# Patient Record
Sex: FEMALE | Race: WHITE | NOT HISPANIC OR LATINO | Employment: FULL TIME | URBAN - METROPOLITAN AREA
[De-identification: names, ages, dates, MRNs, and addresses within clinical notes are randomized per-mention and may not be internally consistent; named-entity substitution may affect disease eponyms.]

---

## 2018-11-29 ENCOUNTER — TRANSCRIBE ORDERS (OUTPATIENT)
Dept: ADMINISTRATIVE | Facility: HOSPITAL | Age: 48
End: 2018-11-29

## 2018-11-29 DIAGNOSIS — Z12.31 VISIT FOR SCREENING MAMMOGRAM: Primary | ICD-10-CM

## 2019-01-14 ENCOUNTER — HOSPITAL ENCOUNTER (OUTPATIENT)
Dept: MAMMOGRAPHY | Facility: HOSPITAL | Age: 49
Discharge: HOME | End: 2019-01-14
Admitting: PHYSICIAN ASSISTANT

## 2019-01-14 ENCOUNTER — APPOINTMENT (OUTPATIENT)
Dept: OTHER | Facility: HOSPITAL | Age: 49
End: 2019-01-14

## 2019-01-14 DIAGNOSIS — Z12.31 VISIT FOR SCREENING MAMMOGRAM: ICD-10-CM

## 2019-01-14 PROCEDURE — 77067 SCR MAMMO BI INCL CAD: CPT

## 2019-01-14 PROCEDURE — 77063 BREAST TOMOSYNTHESIS BI: CPT

## 2019-01-14 PROCEDURE — 77067 SCR MAMMO BI INCL CAD: CPT | Performed by: RADIOLOGY

## 2019-01-14 PROCEDURE — 77063 BREAST TOMOSYNTHESIS BI: CPT | Performed by: RADIOLOGY

## 2019-06-19 PROBLEM — M06.9 RHEUMATOID ARTHRITIS: Status: ACTIVE | Noted: 2019-06-19

## 2019-06-20 ENCOUNTER — OFFICE VISIT (OUTPATIENT)
Dept: INTERNAL MEDICINE | Facility: CLINIC | Age: 49
End: 2019-06-20

## 2019-06-20 ENCOUNTER — APPOINTMENT (OUTPATIENT)
Dept: LAB | Facility: HOSPITAL | Age: 49
End: 2019-06-20

## 2019-06-20 VITALS — SYSTOLIC BLOOD PRESSURE: 180 MMHG | DIASTOLIC BLOOD PRESSURE: 100 MMHG | WEIGHT: 186 LBS | HEART RATE: 60 BPM

## 2019-06-20 DIAGNOSIS — Z13.220 SCREENING FOR LIPID DISORDERS: ICD-10-CM

## 2019-06-20 DIAGNOSIS — M05.9 RHEUMATOID ARTHRITIS WITH POSITIVE RHEUMATOID FACTOR, INVOLVING UNSPECIFIED SITE (HCC): Primary | ICD-10-CM

## 2019-06-20 DIAGNOSIS — R53.83 FATIGUE, UNSPECIFIED TYPE: ICD-10-CM

## 2019-06-20 DIAGNOSIS — M25.50 ARTHRALGIA, UNSPECIFIED JOINT: ICD-10-CM

## 2019-06-20 DIAGNOSIS — R73.09 ABNORMAL GLUCOSE: ICD-10-CM

## 2019-06-20 DIAGNOSIS — I10 ESSENTIAL HYPERTENSION: ICD-10-CM

## 2019-06-20 LAB
25(OH)D3 SERPL-MCNC: 28.2 NG/ML (ref 30–100)
ALBUMIN SERPL-MCNC: 4.7 G/DL (ref 3.5–5.2)
ALBUMIN/GLOB SERPL: 1.4 G/DL
ALP SERPL-CCNC: 95 U/L (ref 39–117)
ALT SERPL W P-5'-P-CCNC: 11 U/L (ref 1–33)
ANION GAP SERPL CALCULATED.3IONS-SCNC: 12.6 MMOL/L
AST SERPL-CCNC: 16 U/L (ref 1–32)
BASOPHILS # BLD AUTO: 0.04 10*3/MM3 (ref 0–0.2)
BASOPHILS NFR BLD AUTO: 0.7 % (ref 0–1.5)
BILIRUB SERPL-MCNC: 0.6 MG/DL (ref 0.2–1.2)
BUN BLD-MCNC: 10 MG/DL (ref 6–20)
BUN/CREAT SERPL: 15.6 (ref 7–25)
CALCIUM SPEC-SCNC: 9.4 MG/DL (ref 8.6–10.5)
CHLORIDE SERPL-SCNC: 99 MMOL/L (ref 98–107)
CHOLEST SERPL-MCNC: 215 MG/DL (ref 0–200)
CO2 SERPL-SCNC: 24.4 MMOL/L (ref 22–29)
CREAT BLD-MCNC: 0.64 MG/DL (ref 0.57–1)
DEPRECATED RDW RBC AUTO: 45.9 FL (ref 37–54)
DIFFERENTIAL METHOD BLD: ABNORMAL
EOSINOPHIL # BLD AUTO: 0.1 10*3/MM3 (ref 0–0.4)
EOSINOPHIL NFR BLD AUTO: 1.6 % (ref 0.3–6.2)
ERYTHROCYTE [DISTWIDTH] IN BLOOD BY AUTOMATED COUNT: 13.1 % (ref 12.3–15.4)
FSH SERPL-ACNC: 22.4 MIU/ML
GFR SERPL CREATININE-BSD FRML MDRD: 99 ML/MIN/1.73
GLOBULIN UR ELPH-MCNC: 3.4 GM/DL
GLUCOSE BLD-MCNC: 87 MG/DL (ref 65–99)
HBA1C MFR BLD: 5 % (ref 4.8–5.6)
HCT VFR BLD AUTO: 46.2 % (ref 34–46.6)
HDLC SERPL-MCNC: 72 MG/DL (ref 40–60)
HGB BLD-MCNC: 14.3 G/DL (ref 12–15.9)
IMM GRANULOCYTES # BLD AUTO: 0.01 10*3/MM3 (ref 0–0.05)
IMM GRANULOCYTES NFR BLD AUTO: 0.2 % (ref 0–0.5)
LDLC SERPL CALC-MCNC: 132 MG/DL (ref 0–100)
LDLC/HDLC SERPL: 1.83 {RATIO}
LYMPHOCYTES # BLD AUTO: 1.9 10*3/MM3 (ref 0.7–3.1)
LYMPHOCYTES NFR BLD AUTO: 30.9 % (ref 19.6–45.3)
MCH RBC QN AUTO: 29.5 PG (ref 26.6–33)
MCHC RBC AUTO-ENTMCNC: 31 G/DL (ref 31.5–35.7)
MCV RBC AUTO: 95.3 FL (ref 79–97)
MONOCYTES # BLD AUTO: 0.37 10*3/MM3 (ref 0.1–0.9)
MONOCYTES NFR BLD AUTO: 6 % (ref 5–12)
NEUTROPHILS # BLD AUTO: 3.73 10*3/MM3 (ref 1.7–7)
NEUTROPHILS NFR BLD AUTO: 60.6 % (ref 42.7–76)
NRBC BLD AUTO-RTO: 0 /100 WBC (ref 0–0.2)
PLATELET # BLD AUTO: 219 10*3/MM3 (ref 140–450)
PMV BLD AUTO: 13.2 FL (ref 6–12)
POTASSIUM BLD-SCNC: 4.9 MMOL/L (ref 3.5–5.2)
PROT SERPL-MCNC: 8.1 G/DL (ref 6–8.5)
RBC # BLD AUTO: 4.85 10*6/MM3 (ref 3.77–5.28)
SODIUM BLD-SCNC: 136 MMOL/L (ref 136–145)
TRIGL SERPL-MCNC: 55 MG/DL (ref 0–150)
TSH SERPL DL<=0.05 MIU/L-ACNC: 0.86 MIU/ML (ref 0.27–4.2)
VIT B12 BLD-MCNC: 524 PG/ML (ref 211–946)
VLDLC SERPL-MCNC: 11 MG/DL (ref 5–40)
WBC # BLD AUTO: 6.15 10*3/MM3 (ref 3.4–10.8)
WBC NRBC COR # BLD: 6.15 10*3/MM3 (ref 3.4–10.8)

## 2019-06-20 PROCEDURE — 84443 ASSAY THYROID STIM HORMONE: CPT | Performed by: PHYSICIAN ASSISTANT

## 2019-06-20 PROCEDURE — 83001 ASSAY OF GONADOTROPIN (FSH): CPT | Performed by: PHYSICIAN ASSISTANT

## 2019-06-20 PROCEDURE — 80053 COMPREHEN METABOLIC PANEL: CPT | Performed by: PHYSICIAN ASSISTANT

## 2019-06-20 PROCEDURE — 82306 VITAMIN D 25 HYDROXY: CPT | Performed by: PHYSICIAN ASSISTANT

## 2019-06-20 PROCEDURE — 80061 LIPID PANEL: CPT | Performed by: PHYSICIAN ASSISTANT

## 2019-06-20 PROCEDURE — 99214 OFFICE O/P EST MOD 30 MIN: CPT | Performed by: PHYSICIAN ASSISTANT

## 2019-06-20 PROCEDURE — 82043 UR ALBUMIN QUANTITATIVE: CPT | Performed by: PHYSICIAN ASSISTANT

## 2019-06-20 PROCEDURE — 83036 HEMOGLOBIN GLYCOSYLATED A1C: CPT | Performed by: PHYSICIAN ASSISTANT

## 2019-06-20 PROCEDURE — 85025 COMPLETE CBC W/AUTO DIFF WBC: CPT | Performed by: PHYSICIAN ASSISTANT

## 2019-06-20 PROCEDURE — 85652 RBC SED RATE AUTOMATED: CPT | Performed by: PHYSICIAN ASSISTANT

## 2019-06-20 PROCEDURE — 82607 VITAMIN B-12: CPT | Performed by: PHYSICIAN ASSISTANT

## 2019-06-20 RX ORDER — LISINOPRIL 10 MG/1
10 TABLET ORAL DAILY
Qty: 30 TABLET | Refills: 2 | Status: SHIPPED | OUTPATIENT
Start: 2019-06-20 | End: 2019-09-23 | Stop reason: SDUPTHER

## 2019-06-20 RX ORDER — HYDROXYZINE HYDROCHLORIDE 25 MG/1
25 TABLET, FILM COATED ORAL 3 TIMES DAILY PRN
Qty: 30 TABLET | Refills: 0 | Status: SHIPPED | OUTPATIENT
Start: 2019-06-20 | End: 2019-07-15

## 2019-06-20 RX ORDER — HYDROCHLOROTHIAZIDE 25 MG/1
25 TABLET ORAL DAILY
Qty: 30 TABLET | Refills: 3 | Status: SHIPPED | OUTPATIENT
Start: 2019-06-20 | End: 2019-09-30 | Stop reason: SDUPTHER

## 2019-06-20 NOTE — PROGRESS NOTES
Patient Care Team:  Aimee Levine PA-C as PCP - General (Internal Medicine)    Chief Complaint::   Chief Complaint   Patient presents with   • Fatigue   • Joint Swelling   • Nausea        Subjective     HPI  Pt has hx of elevated inflammatory markers, told she had RA, never did any type of treatment.      Stressful job as .  She recently has changed locations within the company.   is out of town this month. He is a .  Her ex-, father of her two children,  suddenly.      She experiences joint pain mostly in the morning.  Gets better as she moves a bit.  Pain returns around 2pm.         Hypertension   This is a new problem. The current episode started more than 1 month ago. The problem has been gradually worsening since onset. The problem is uncontrolled. Associated symptoms include anxiety, headaches, malaise/fatigue and peripheral edema. Pertinent negatives include no blurred vision, chest pain, palpitations or shortness of breath. There are no associated agents to hypertension. Risk factors for coronary artery disease include obesity, sedentary lifestyle, stress and family history. Past treatments include nothing. Current antihypertension treatment includes nothing. Compliance problems include diet and exercise.    Fatigue   This is a recurrent problem. The current episode started more than 1 month ago. The problem occurs constantly. The problem has been gradually worsening. Associated symptoms include arthralgias, fatigue, headaches, joint swelling and myalgias. Pertinent negatives include no abdominal pain, chest pain, chills, congestion, coughing or fever. Nothing aggravates the symptoms.       PMH  The following portions of the patient's history were reviewed and updated as appropriate: allergies, current medications, past family history, past medical history, past social history, past surgical history and problem list.    Review of Systems:    Review of Systems   Constitutional: Positive for fatigue and malaise/fatigue. Negative for activity change, appetite change, chills and fever.   HENT: Negative for congestion, ear pain and sinus pressure.    Eyes: Negative for blurred vision.   Respiratory: Negative for cough, chest tightness, shortness of breath and wheezing.    Cardiovascular: Positive for leg swelling. Negative for chest pain and palpitations.   Gastrointestinal: Negative for abdominal distention, abdominal pain, blood in stool and constipation.   Endocrine: Negative for cold intolerance and heat intolerance.   Musculoskeletal: Positive for arthralgias, joint swelling and myalgias.   Skin: Negative for color change.   Allergic/Immunologic: Negative for environmental allergies and food allergies.   Neurological: Negative for dizziness, speech difficulty, light-headedness and headache.   Psychiatric/Behavioral: Positive for stress. Negative for decreased concentration. The patient is not nervous/anxious.        Vital Signs  Vitals:    06/20/19 1252 06/20/19 1417   BP: (!) 192/98 180/100   BP Location: Left arm    Patient Position: Sitting    Cuff Size: Adult    Pulse: 60    Weight: 84.4 kg (186 lb)        Labs  No visits with results within 3 Month(s) from this visit.   Latest known visit with results is:   No results found for any previous visit.       Imaging  All imaging:   Imaging Results (all)     None             Current Outpatient Medications:   •  hydrochlorothiazide (HYDRODIURIL) 25 MG tablet, Take 1 tablet by mouth Daily., Disp: 30 tablet, Rfl: 3  •  hydrOXYzine (ATARAX) 25 MG tablet, Take 1 tablet by mouth 3 (Three) Times a Day As Needed for Itching., Disp: 30 tablet, Rfl: 0  •  lisinopril (PRINIVIL,ZESTRIL) 10 MG tablet, Take 1 tablet by mouth Daily., Disp: 30 tablet, Rfl: 2    Physical Exam:    Physical Exam   Constitutional: She is oriented to person, place, and time. She appears well-developed and well-nourished.   HENT:   Head:  Normocephalic and atraumatic.   Nose: Nose normal.   Mouth/Throat: Oropharynx is clear and moist.   Eyes: Conjunctivae and EOM are normal. Pupils are equal, round, and reactive to light.   Neck: Normal range of motion. Neck supple.   Cardiovascular: Normal rate, regular rhythm, normal heart sounds and intact distal pulses.   Pulmonary/Chest: Effort normal and breath sounds normal.   Abdominal: Soft. Bowel sounds are normal.   Musculoskeletal: She exhibits edema and tenderness.   Neurological: She is alert and oriented to person, place, and time.   Skin: Skin is warm and dry.   Psychiatric: She has a normal mood and affect. Her behavior is normal. Judgment and thought content normal.   Nursing note and vitals reviewed.      Procedures        Assessment/Plan   Problem List Items Addressed This Visit        Cardiovascular and Mediastinum    Essential hypertension    Current Assessment & Plan     Reduce salt intake.  New medications added today.  Monitor BP daily and keep a BP log         Relevant Medications    lisinopril (PRINIVIL,ZESTRIL) 10 MG tablet    hydrochlorothiazide (HYDRODIURIL) 25 MG tablet    Other Relevant Orders    Comprehensive Metabolic Panel    TSH    MicroAlbumin, Urine, Random - Urine, Clean Catch       Musculoskeletal and Integument    Rheumatoid arthritis (CMS/HCC) - Primary    Relevant Orders    Sedimentation Rate      Other Visit Diagnoses     Fatigue, unspecified type        Relevant Orders    CBC & Differential    Sedimentation Rate    Follicle Stimulating Hormone    Vitamin B12    CBC Auto Differential    Arthralgia, unspecified joint        Relevant Orders    Sedimentation Rate    Vitamin D 25 Hydroxy    Abnormal glucose        Relevant Orders    Hemoglobin A1c    Screening for lipid disorders        Relevant Orders    Comprehensive Metabolic Panel    Lipid Panel          Return in about 3 weeks (around 7/11/2019).    Plan of care reviewed with patient at the conclusion of today's visit.  Education was provided regarding diagnosis, management, and any prescribed or recommended OTC medications.Patient verbalizes understanding of and agreement with management plan.     Aimee Levine PA-C

## 2019-06-21 LAB
ALBUMIN UR-MCNC: <1.2 MG/L
ERYTHROCYTE [SEDIMENTATION RATE] IN BLOOD: 11 MM/HR (ref 0–20)

## 2019-06-26 DIAGNOSIS — E55.9 VITAMIN D DEFICIENCY: Primary | ICD-10-CM

## 2019-06-26 RX ORDER — ERGOCALCIFEROL 1.25 MG/1
50000 CAPSULE ORAL WEEKLY
Qty: 4 CAPSULE | Refills: 3 | Status: SHIPPED | OUTPATIENT
Start: 2019-06-26 | End: 2019-09-30 | Stop reason: SDUPTHER

## 2019-07-15 ENCOUNTER — OFFICE VISIT (OUTPATIENT)
Dept: INTERNAL MEDICINE | Facility: CLINIC | Age: 49
End: 2019-07-15

## 2019-07-15 VITALS — WEIGHT: 182 LBS | SYSTOLIC BLOOD PRESSURE: 122 MMHG | DIASTOLIC BLOOD PRESSURE: 84 MMHG | HEART RATE: 76 BPM

## 2019-07-15 DIAGNOSIS — R11.0 NAUSEA: ICD-10-CM

## 2019-07-15 DIAGNOSIS — I10 ESSENTIAL HYPERTENSION: ICD-10-CM

## 2019-07-15 DIAGNOSIS — M79.10 MYALGIA: Primary | ICD-10-CM

## 2019-07-15 PROCEDURE — 99214 OFFICE O/P EST MOD 30 MIN: CPT | Performed by: PHYSICIAN ASSISTANT

## 2019-07-15 RX ORDER — CYCLOBENZAPRINE HCL 10 MG
10 TABLET ORAL NIGHTLY PRN
Qty: 30 TABLET | Refills: 1 | Status: SHIPPED | OUTPATIENT
Start: 2019-07-15 | End: 2020-09-01

## 2019-07-15 NOTE — ASSESSMENT & PLAN NOTE
Neck tightness/jaw clenching worse at bedtime.  Will try trial of Flexeril 10mg-1/2 tab to 1 at bedtime to see if this helps.

## 2019-07-15 NOTE — PROGRESS NOTES
"Patient Care Team:  Aimee Levine PA-C as PCP - General (Internal Medicine)    Chief Complaint::   Chief Complaint   Patient presents with   • Rheumatoid Arthritis   • Fatigue     Not improved   • Hypertension     Improving    • Nausea     Lasting most of the day         Subjective     HPI  Patient presents today for follow-up on hypertension.  Her blood pressure in the office was 180/100 at last visit.  She was started on lisinopril 10 mg daily and hydrochlorothiazide 25 mg tablet daily.  Blood pressures and edema have improved.  Patient has lost 4 pounds since last appointment.  She denies headaches, dizziness, or palpitations.  She is walking 1 to 2 miles daily.  Her fatigue has improved.  She does report history of nausea which has worsened over the past month.  She was unsure if it was related to medication time, different foods, or something else.  She does not see any pattern with when the nausea occurs.  She denies reflux, indigestion, or bloating. Denies dysphagia.  Lastly, she has had neck tightness from clenching teeth at night.  She denies jaw pain or \"popping\" with biting.         PMH  The following portions of the patient's history were reviewed and updated as appropriate: allergies, current medications, past family history, past medical history, past social history, past surgical history and problem list.    Review of Systems:   Review of Systems   Constitutional: Positive for activity change and fatigue. Negative for appetite change, chills and fever.   HENT: Negative for congestion, ear pain and sinus pressure.    Eyes: Negative for blurred vision and double vision.   Respiratory: Negative for cough, chest tightness, shortness of breath and wheezing.    Cardiovascular: Negative for chest pain and palpitations.   Gastrointestinal: Negative for abdominal pain, blood in stool and constipation.   Endocrine: Negative for cold intolerance and heat intolerance.   Musculoskeletal: Positive for " myalgias and neck pain.   Skin: Negative for color change and dry skin.   Allergic/Immunologic: Negative for environmental allergies.   Neurological: Negative for dizziness, speech difficulty and headache.   Hematological: Negative for adenopathy. Does not bruise/bleed easily.   Psychiatric/Behavioral: Negative for decreased concentration. The patient is not nervous/anxious.        Vital Signs  Vitals:    07/15/19 1016   BP: 122/84   BP Location: Left arm   Patient Position: Sitting   Cuff Size: Adult   Pulse: 76   Weight: 82.6 kg (182 lb)   PainSc:   3   PainLoc: Generalized       Labs  Office Visit on 06/20/2019   Component Date Value Ref Range Status   • Glucose 06/20/2019 87  65 - 99 mg/dL Final   • BUN 06/20/2019 10  6 - 20 mg/dL Final   • Creatinine 06/20/2019 0.64  0.57 - 1.00 mg/dL Final   • Sodium 06/20/2019 136  136 - 145 mmol/L Final   • Potassium 06/20/2019 4.9  3.5 - 5.2 mmol/L Final   • Chloride 06/20/2019 99  98 - 107 mmol/L Final   • CO2 06/20/2019 24.4  22.0 - 29.0 mmol/L Final   • Calcium 06/20/2019 9.4  8.6 - 10.5 mg/dL Final   • Total Protein 06/20/2019 8.1  6.0 - 8.5 g/dL Final   • Albumin 06/20/2019 4.70  3.50 - 5.20 g/dL Final   • ALT (SGPT) 06/20/2019 11  1 - 33 U/L Final   • AST (SGOT) 06/20/2019 16  1 - 32 U/L Final   • Alkaline Phosphatase 06/20/2019 95  39 - 117 U/L Final   • Total Bilirubin 06/20/2019 0.6  0.2 - 1.2 mg/dL Final   • eGFR Non African Amer 06/20/2019 99  >60 mL/min/1.73 Final   • Globulin 06/20/2019 3.4  gm/dL Final   • A/G Ratio 06/20/2019 1.4  g/dL Final   • BUN/Creatinine Ratio 06/20/2019 15.6  7.0 - 25.0 Final   • Anion Gap 06/20/2019 12.6  mmol/L Final   • TSH 06/20/2019 0.863  0.270 - 4.200 mIU/mL Final   • Total Cholesterol 06/20/2019 215* 0 - 200 mg/dL Final   • Triglycerides 06/20/2019 55  0 - 150 mg/dL Final   • HDL Cholesterol 06/20/2019 72* 40 - 60 mg/dL Final   • LDL Cholesterol  06/20/2019 132* 0 - 100 mg/dL Final   • VLDL Cholesterol 06/20/2019 11  5 - 40  mg/dL Final   • LDL/HDL Ratio 06/20/2019 1.83   Final   • Microalbumin, Urine 06/20/2019 <1.2  mg/L Final   • Sed Rate 06/20/2019 11  0 - 20 mm/hr Final   • FSH 06/20/2019 22.40  mIU/mL Final   • Hemoglobin A1C 06/20/2019 5.00  4.80 - 5.60 % Final   • 25 Hydroxy, Vitamin D 06/20/2019 28.2* 30.0 - 100.0 ng/ml Final   • Vitamin B-12 06/20/2019 524  211 - 946 pg/mL Final   • WBC 06/20/2019 6.15  3.40 - 10.80 10*3/mm3 Final   • RBC 06/20/2019 4.85  3.77 - 5.28 10*6/mm3 Final   • Hemoglobin 06/20/2019 14.3  12.0 - 15.9 g/dL Final   • Hematocrit 06/20/2019 46.2  34.0 - 46.6 % Final   • MCV 06/20/2019 95.3  79.0 - 97.0 fL Final   • MCH 06/20/2019 29.5  26.6 - 33.0 pg Final   • MCHC 06/20/2019 31.0* 31.5 - 35.7 g/dL Final   • RDW 06/20/2019 13.1  12.3 - 15.4 % Final   • RDW-SD 06/20/2019 45.9  37.0 - 54.0 fl Final   • MPV 06/20/2019 13.2* 6.0 - 12.0 fL Final   • Platelets 06/20/2019 219  140 - 450 10*3/mm3 Final   • Neutrophil % 06/20/2019 60.6  42.7 - 76.0 % Final   • Lymphocyte % 06/20/2019 30.9  19.6 - 45.3 % Final   • Monocyte % 06/20/2019 6.0  5.0 - 12.0 % Final   • Eosinophil % 06/20/2019 1.6  0.3 - 6.2 % Final   • Basophil % 06/20/2019 0.7  0.0 - 1.5 % Final   • Immature Grans % 06/20/2019 0.2  0.0 - 0.5 % Final   • Neutrophils, Absolute 06/20/2019 3.73  1.70 - 7.00 10*3/mm3 Final   • Lymphocytes, Absolute 06/20/2019 1.90  0.70 - 3.10 10*3/mm3 Final   • Monocytes, Absolute 06/20/2019 0.37  0.10 - 0.90 10*3/mm3 Final   • Eosinophils, Absolute 06/20/2019 0.10  0.00 - 0.40 10*3/mm3 Final   • Basophils, Absolute 06/20/2019 0.04  0.00 - 0.20 10*3/mm3 Final   • Immature Grans, Absolute 06/20/2019 0.01  0.00 - 0.05 10*3/mm3 Final   • nRBC 06/20/2019 0.0  0.0 - 0.2 /100 WBC Final   • WBC 06/20/2019 6.15  3.40 - 10.80 10*3/mm3 Final       Current Outpatient Medications:   •  hydrochlorothiazide (HYDRODIURIL) 25 MG tablet, Take 1 tablet by mouth Daily., Disp: 30 tablet, Rfl: 3  •  lisinopril (PRINIVIL,ZESTRIL) 10 MG tablet,  Take 1 tablet by mouth Daily., Disp: 30 tablet, Rfl: 2  •  vitamin D (ERGOCALCIFEROL) 33953 units capsule capsule, Take 1 capsule by mouth 1 (One) Time Per Week., Disp: 4 capsule, Rfl: 3  •  cyclobenzaprine (FLEXERIL) 10 MG tablet, Take 1 tablet by mouth At Night As Needed for Muscle Spasms., Disp: 30 tablet, Rfl: 1    Physical Exam:    Physical Exam   Constitutional: She is oriented to person, place, and time. She appears well-developed and well-nourished.   HENT:   Head: Normocephalic and atraumatic.   Right Ear: Hearing, tympanic membrane, external ear and ear canal normal.   Left Ear: Hearing, tympanic membrane, external ear and ear canal normal.   Nose: Nose normal.   Mouth/Throat: Uvula is midline, oropharynx is clear and moist and mucous membranes are normal.   Eyes: Conjunctivae, EOM and lids are normal. Pupils are equal, round, and reactive to light.   Neck: Normal range of motion and full passive range of motion without pain. Neck supple.   Cardiovascular: Normal rate, regular rhythm, normal heart sounds and intact distal pulses.   Pulmonary/Chest: Effort normal and breath sounds normal.   Abdominal: Soft. Normal appearance and bowel sounds are normal.   Musculoskeletal: Normal range of motion.   Neurological: She is alert and oriented to person, place, and time. She has normal strength and normal reflexes.   Skin: Skin is warm, dry and intact.   Psychiatric: She has a normal mood and affect. Her speech is normal and behavior is normal. Judgment and thought content normal. Cognition and memory are normal.   Vitals reviewed.      Procedures        Assessment/Plan   Problem List Items Addressed This Visit        Cardiovascular and Mediastinum    Essential hypertension    Current Assessment & Plan     Continue blood pressure medication as directed.  Discussed continued reduction of salt.  Drink plenty of fluids.  Continue daily exercise.         Relevant Medications    lisinopril (PRINIVIL,ZESTRIL) 10 MG  tablet    hydrochlorothiazide (HYDRODIURIL) 25 MG tablet       Digestive    Nausea    Current Assessment & Plan     Pt is instructed to monitor timing of nausea.  We will address at 3 month appt.            Nervous and Auditory    Myalgia - Primary    Current Assessment & Plan     Neck tightness/jaw clenching worse at bedtime.  Will try trial of Flexeril 10mg-1/2 tab to 1 at bedtime to see if this helps.           Relevant Medications    cyclobenzaprine (FLEXERIL) 10 MG tablet          Return in about 3 months (around 10/15/2019) for Recheck.    Plan of care reviewed with patient at the conclusion of today's visit. Education was provided regarding diagnosis, management, and any prescribed or recommended OTC medications.Patient verbalizes understanding of and agreement with management plan.     Aimee Levine PA-C

## 2019-07-15 NOTE — ASSESSMENT & PLAN NOTE
Continue blood pressure medication as directed.  Discussed continued reduction of salt.  Drink plenty of fluids.  Continue daily exercise.

## 2019-09-23 RX ORDER — LISINOPRIL 10 MG/1
TABLET ORAL
Qty: 90 TABLET | Refills: 0 | Status: SHIPPED | OUTPATIENT
Start: 2019-09-23 | End: 2019-10-14 | Stop reason: SDUPTHER

## 2019-09-30 DIAGNOSIS — E55.9 VITAMIN D DEFICIENCY: ICD-10-CM

## 2019-09-30 RX ORDER — ERGOCALCIFEROL 1.25 MG/1
50000 CAPSULE ORAL WEEKLY
Qty: 4 CAPSULE | Refills: 3 | Status: SHIPPED | OUTPATIENT
Start: 2019-09-30 | End: 2020-09-15

## 2019-09-30 RX ORDER — HYDROCHLOROTHIAZIDE 25 MG/1
25 TABLET ORAL DAILY
Qty: 30 TABLET | Refills: 3 | Status: SHIPPED | OUTPATIENT
Start: 2019-09-30 | End: 2019-10-14 | Stop reason: SDUPTHER

## 2019-10-14 ENCOUNTER — OFFICE VISIT (OUTPATIENT)
Dept: INTERNAL MEDICINE | Facility: CLINIC | Age: 49
End: 2019-10-14

## 2019-10-14 VITALS
HEIGHT: 69 IN | HEART RATE: 64 BPM | BODY MASS INDEX: 28.14 KG/M2 | TEMPERATURE: 97.6 F | DIASTOLIC BLOOD PRESSURE: 70 MMHG | WEIGHT: 190 LBS | SYSTOLIC BLOOD PRESSURE: 122 MMHG

## 2019-10-14 DIAGNOSIS — R11.0 NAUSEA: ICD-10-CM

## 2019-10-14 DIAGNOSIS — M79.10 MYALGIA: ICD-10-CM

## 2019-10-14 DIAGNOSIS — M05.9 RHEUMATOID ARTHRITIS WITH POSITIVE RHEUMATOID FACTOR, INVOLVING UNSPECIFIED SITE (HCC): ICD-10-CM

## 2019-10-14 DIAGNOSIS — I10 ESSENTIAL HYPERTENSION: Primary | ICD-10-CM

## 2019-10-14 PROBLEM — E55.9 VITAMIN D DEFICIENCY: Status: ACTIVE | Noted: 2019-10-14

## 2019-10-14 PROCEDURE — 90674 CCIIV4 VAC NO PRSV 0.5 ML IM: CPT | Performed by: PHYSICIAN ASSISTANT

## 2019-10-14 PROCEDURE — 90471 IMMUNIZATION ADMIN: CPT | Performed by: PHYSICIAN ASSISTANT

## 2019-10-14 PROCEDURE — 99214 OFFICE O/P EST MOD 30 MIN: CPT | Performed by: PHYSICIAN ASSISTANT

## 2019-10-14 RX ORDER — LISINOPRIL 10 MG/1
10 TABLET ORAL DAILY
Qty: 90 TABLET | Refills: 1 | Status: SHIPPED | OUTPATIENT
Start: 2019-10-14 | End: 2020-12-22

## 2019-10-14 RX ORDER — HYDROCHLOROTHIAZIDE 25 MG/1
25 TABLET ORAL DAILY
Qty: 30 TABLET | Refills: 3 | Status: SHIPPED | OUTPATIENT
Start: 2019-10-14 | End: 2020-09-28

## 2019-10-14 NOTE — PATIENT INSTRUCTIONS

## 2019-10-14 NOTE — PROGRESS NOTES
Patient Care Team:  Aimee Levine PA-C as PCP - General (Internal Medicine)    Chief Complaint::   Chief Complaint   Patient presents with   • Hypertension        Subjective     HPI  Neck pain has improved with flexeril.  She has stopped taking flexeril.    Nausea has resolved.   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  She has had increased anxiety with this.  She reports working 7 am to 7 pm with little time for exercise.    She is compliant with blood pressure medication and denies headaches, SOA, or chest pain.        PMH  The following portions of the patient's history were reviewed and updated as appropriate: allergies, current medications, past family history, past medical history, past social history, past surgical history and problem list.    Review of Systems:   Review of Systems   Constitutional: Positive for activity change and appetite change. Negative for chills, fatigue, fever, unexpected weight gain and unexpected weight loss.   HENT: Negative for congestion, ear pain and sinus pressure.    Eyes: Negative for blurred vision.   Respiratory: Negative for cough, chest tightness, shortness of breath and wheezing.    Cardiovascular: Negative for chest pain, palpitations and leg swelling.   Gastrointestinal: Negative for abdominal distention, abdominal pain, blood in stool, constipation, diarrhea and nausea.   Skin: Negative for color change and rash.   Allergic/Immunologic: Negative for environmental allergies.   Neurological: Negative for dizziness, syncope, speech difficulty, weakness and headache.   Psychiatric/Behavioral: Negative for decreased concentration and dysphoric mood. The patient is not nervous/anxious.        Vital Signs  Vitals:    10/14/19 0947   BP: 122/70   BP Location: Left arm   Patient Position: Sitting   Cuff Size: Adult   Pulse: 64   Temp: 97.6 °F (36.4 °C)   TempSrc: Temporal   Weight: 86.2 kg (190  "lb)   Height: 175.3 cm (69\")   PainSc: 0-No pain     Body mass index is 28.06 kg/m².    Labs  No visits with results within 3 Month(s) from this visit.   Latest known visit with results is:   Office Visit on 06/20/2019   Component Date Value Ref Range Status   • Glucose 06/20/2019 87  65 - 99 mg/dL Final   • BUN 06/20/2019 10  6 - 20 mg/dL Final   • Creatinine 06/20/2019 0.64  0.57 - 1.00 mg/dL Final   • Sodium 06/20/2019 136  136 - 145 mmol/L Final   • Potassium 06/20/2019 4.9  3.5 - 5.2 mmol/L Final   • Chloride 06/20/2019 99  98 - 107 mmol/L Final   • CO2 06/20/2019 24.4  22.0 - 29.0 mmol/L Final   • Calcium 06/20/2019 9.4  8.6 - 10.5 mg/dL Final   • Total Protein 06/20/2019 8.1  6.0 - 8.5 g/dL Final   • Albumin 06/20/2019 4.70  3.50 - 5.20 g/dL Final   • ALT (SGPT) 06/20/2019 11  1 - 33 U/L Final   • AST (SGOT) 06/20/2019 16  1 - 32 U/L Final   • Alkaline Phosphatase 06/20/2019 95  39 - 117 U/L Final   • Total Bilirubin 06/20/2019 0.6  0.2 - 1.2 mg/dL Final   • eGFR Non African Amer 06/20/2019 99  >60 mL/min/1.73 Final   • Globulin 06/20/2019 3.4  gm/dL Final   • A/G Ratio 06/20/2019 1.4  g/dL Final   • BUN/Creatinine Ratio 06/20/2019 15.6  7.0 - 25.0 Final   • Anion Gap 06/20/2019 12.6  mmol/L Final   • TSH 06/20/2019 0.863  0.270 - 4.200 mIU/mL Final   • Total Cholesterol 06/20/2019 215* 0 - 200 mg/dL Final   • Triglycerides 06/20/2019 55  0 - 150 mg/dL Final   • HDL Cholesterol 06/20/2019 72* 40 - 60 mg/dL Final   • LDL Cholesterol  06/20/2019 132* 0 - 100 mg/dL Final   • VLDL Cholesterol 06/20/2019 11  5 - 40 mg/dL Final   • LDL/HDL Ratio 06/20/2019 1.83   Final   • Microalbumin, Urine 06/20/2019 <1.2  mg/L Final   • Sed Rate 06/20/2019 11  0 - 20 mm/hr Final   • FSH 06/20/2019 22.40  mIU/mL Final   • Hemoglobin A1C 06/20/2019 5.00  4.80 - 5.60 % Final   • 25 Hydroxy, Vitamin D 06/20/2019 28.2* 30.0 - 100.0 ng/ml Final   • Vitamin B-12 06/20/2019 524  211 - 946 pg/mL Final   • WBC 06/20/2019 6.15  3.40 - " 10.80 10*3/mm3 Final   • RBC 06/20/2019 4.85  3.77 - 5.28 10*6/mm3 Final   • Hemoglobin 06/20/2019 14.3  12.0 - 15.9 g/dL Final   • Hematocrit 06/20/2019 46.2  34.0 - 46.6 % Final   • MCV 06/20/2019 95.3  79.0 - 97.0 fL Final   • MCH 06/20/2019 29.5  26.6 - 33.0 pg Final   • MCHC 06/20/2019 31.0* 31.5 - 35.7 g/dL Final   • RDW 06/20/2019 13.1  12.3 - 15.4 % Final   • RDW-SD 06/20/2019 45.9  37.0 - 54.0 fl Final   • MPV 06/20/2019 13.2* 6.0 - 12.0 fL Final   • Platelets 06/20/2019 219  140 - 450 10*3/mm3 Final   • Neutrophil % 06/20/2019 60.6  42.7 - 76.0 % Final   • Lymphocyte % 06/20/2019 30.9  19.6 - 45.3 % Final   • Monocyte % 06/20/2019 6.0  5.0 - 12.0 % Final   • Eosinophil % 06/20/2019 1.6  0.3 - 6.2 % Final   • Basophil % 06/20/2019 0.7  0.0 - 1.5 % Final   • Immature Grans % 06/20/2019 0.2  0.0 - 0.5 % Final   • Neutrophils, Absolute 06/20/2019 3.73  1.70 - 7.00 10*3/mm3 Final   • Lymphocytes, Absolute 06/20/2019 1.90  0.70 - 3.10 10*3/mm3 Final   • Monocytes, Absolute 06/20/2019 0.37  0.10 - 0.90 10*3/mm3 Final   • Eosinophils, Absolute 06/20/2019 0.10  0.00 - 0.40 10*3/mm3 Final   • Basophils, Absolute 06/20/2019 0.04  0.00 - 0.20 10*3/mm3 Final   • Immature Grans, Absolute 06/20/2019 0.01  0.00 - 0.05 10*3/mm3 Final   • nRBC 06/20/2019 0.0  0.0 - 0.2 /100 WBC Final   • WBC 06/20/2019 6.15  3.40 - 10.80 10*3/mm3 Final       Imaging  No radiology results for the last 30 days.      Current Outpatient Medications:   •  hydroCHLOROthiazide (HYDRODIURIL) 25 MG tablet, Take 1 tablet by mouth Daily., Disp: 30 tablet, Rfl: 3  •  lisinopril (PRINIVIL,ZESTRIL) 10 MG tablet, Take 1 tablet by mouth Daily., Disp: 90 tablet, Rfl: 1  •  vitamin D (ERGOCALCIFEROL) 00591 units capsule capsule, Take 1 capsule by mouth 1 (One) Time Per Week., Disp: 4 capsule, Rfl: 3  •  cyclobenzaprine (FLEXERIL) 10 MG tablet, Take 1 tablet by mouth At Night As Needed for Muscle Spasms., Disp: 30 tablet, Rfl: 1    Physical Exam:    Physical  Exam   Constitutional: She is oriented to person, place, and time. She appears well-developed and well-nourished.   HENT:   Head: Normocephalic and atraumatic.   Right Ear: Hearing, tympanic membrane, external ear and ear canal normal.   Left Ear: Hearing, tympanic membrane, external ear and ear canal normal.   Nose: Nose normal.   Mouth/Throat: Uvula is midline, oropharynx is clear and moist and mucous membranes are normal.   Eyes: Conjunctivae, EOM and lids are normal. Pupils are equal, round, and reactive to light.   Neck: Normal range of motion and full passive range of motion without pain. Neck supple.   Cardiovascular: Normal rate, regular rhythm, normal heart sounds and intact distal pulses.   Pulmonary/Chest: Effort normal and breath sounds normal.   Abdominal: Soft. Normal appearance and bowel sounds are normal.   Musculoskeletal: Normal range of motion.   Neurological: She is alert and oriented to person, place, and time. She has normal strength and normal reflexes.   Skin: Skin is warm, dry and intact.   Psychiatric: She has a normal mood and affect. Her speech is normal and behavior is normal. Judgment and thought content normal. Cognition and memory are normal.   Vitals reviewed.      Procedures        Assessment/Plan   Problem List Items Addressed This Visit        Cardiovascular and Mediastinum    Essential hypertension - Primary    Current Assessment & Plan     Hypertension is improving with treatment.  Dietary sodium restriction.  Weight loss.  Regular aerobic exercise.  Continue current medications.  Blood pressure will be reassessed at the next regular appointment.         Relevant Medications    hydroCHLOROthiazide (HYDRODIURIL) 25 MG tablet    lisinopril (PRINIVIL,ZESTRIL) 10 MG tablet       Digestive    Nausea    Current Assessment & Plan     Resolved.            Nervous and Auditory    Myalgia    Current Assessment & Plan     Resolved.         Relevant Medications    cyclobenzaprine (FLEXERIL)  10 MG tablet       Musculoskeletal and Integument    Rheumatoid arthritis (CMS/[facility])    Current Assessment & Plan     Stable            Other    BMI 28.0-28.9,adult    Current Assessment & Plan     She has had 8 pound weight increase since 7/15/2019.  Discussed importance of regular exercise and low fat/low calorie eating.               Return in about 6 months (around 4/14/2020) for Annual physical.    Plan of care reviewed with patient at the conclusion of today's visit. Education was provided regarding diagnosis, management, and any prescribed or recommended OTC medications.Patient verbalizes understanding of and agreement with management plan.     Aimee Levine PA-C

## 2019-10-14 NOTE — ASSESSMENT & PLAN NOTE
She has had 8 pound weight increase since 7/15/2019.  Discussed importance of regular exercise and low fat/low calorie eating.

## 2020-09-01 ENCOUNTER — OFFICE VISIT (OUTPATIENT)
Dept: INTERNAL MEDICINE | Facility: CLINIC | Age: 50
End: 2020-09-01

## 2020-09-01 VITALS
BODY MASS INDEX: 27.96 KG/M2 | HEIGHT: 69 IN | TEMPERATURE: 97.1 F | SYSTOLIC BLOOD PRESSURE: 126 MMHG | DIASTOLIC BLOOD PRESSURE: 70 MMHG | WEIGHT: 188.8 LBS | HEART RATE: 66 BPM

## 2020-09-01 DIAGNOSIS — Z13.21 ENCOUNTER FOR VITAMIN DEFICIENCY SCREENING: ICD-10-CM

## 2020-09-01 DIAGNOSIS — R73.09 ABNORMAL GLUCOSE: ICD-10-CM

## 2020-09-01 DIAGNOSIS — N94.3 PMS (PREMENSTRUAL SYNDROME): Primary | ICD-10-CM

## 2020-09-01 DIAGNOSIS — M05.9 RHEUMATOID ARTHRITIS WITH POSITIVE RHEUMATOID FACTOR, INVOLVING UNSPECIFIED SITE (HCC): ICD-10-CM

## 2020-09-01 DIAGNOSIS — Z01.419 ROUTINE GYNECOLOGICAL EXAMINATION: ICD-10-CM

## 2020-09-01 DIAGNOSIS — Z13.220 SCREENING FOR LIPID DISORDERS: ICD-10-CM

## 2020-09-01 DIAGNOSIS — I10 ESSENTIAL HYPERTENSION: ICD-10-CM

## 2020-09-01 DIAGNOSIS — R11.0 NAUSEA: ICD-10-CM

## 2020-09-01 DIAGNOSIS — E55.9 VITAMIN D DEFICIENCY: ICD-10-CM

## 2020-09-01 DIAGNOSIS — Z12.31 ENCOUNTER FOR SCREENING MAMMOGRAM FOR BREAST CANCER: ICD-10-CM

## 2020-09-01 DIAGNOSIS — N92.6 IRREGULAR MENSES: ICD-10-CM

## 2020-09-01 PROCEDURE — 99396 PREV VISIT EST AGE 40-64: CPT | Performed by: PHYSICIAN ASSISTANT

## 2020-09-01 PROCEDURE — 99214 OFFICE O/P EST MOD 30 MIN: CPT | Performed by: PHYSICIAN ASSISTANT

## 2020-09-01 RX ORDER — FLUOXETINE 10 MG/1
TABLET, FILM COATED ORAL
Qty: 30 TABLET | Refills: 2 | Status: SHIPPED | OUTPATIENT
Start: 2020-09-01 | End: 2021-04-11

## 2020-09-01 NOTE — PROGRESS NOTES
Patient Care Team:  Aimee Levine PA-C as PCP - General (Internal Medicine)    Chief Complaint::   Chief Complaint   Patient presents with   • Annual Exam   • Headache     upon awakening-  nausea    • PHQ9 today-  see score        Subjective     HPI  50 year old female presents for annual exam and follow up on hypertension, vitamin D deficiency, rheumatoid arthritis. New problem of Irregular menses for the past 6 months.  She is having increase in mood swings, usually starting two days before her period. She denies pelvic pain or dyspareunia. She reports having nausea in the mornings.  This usually lasts for ~ 2 hours until she eats. Weight has remained stable over the past year.        The following portions of the patient's history were reviewed and updated as appropriate: active problem list, medication list, allergies, family history, social history    Review of Systems:   Review of Systems   Constitutional: Negative for activity change, appetite change, chills, diaphoresis, fatigue, fever, unexpected weight gain and unexpected weight loss.   HENT: Negative for congestion, ear pain, hearing loss and sinus pressure.    Eyes: Negative for visual disturbance.   Respiratory: Negative for cough, chest tightness, shortness of breath and wheezing.    Cardiovascular: Negative for chest pain, palpitations and leg swelling.   Gastrointestinal: Positive for constipation and nausea. Negative for abdominal pain, blood in stool, GERD and indigestion.   Endocrine: Negative for cold intolerance and heat intolerance.   Genitourinary: Positive for menstrual problem. Negative for dysuria and hematuria.   Musculoskeletal: Negative for arthralgias and myalgias.   Skin: Negative for color change and skin lesions.   Allergic/Immunologic: Negative for environmental allergies.   Neurological: Negative for dizziness, tremors, seizures, syncope, speech difficulty, weakness, headache, memory problem and confusion.   Hematological:  "Does not bruise/bleed easily.   Psychiatric/Behavioral: Negative for decreased concentration, sleep disturbance and depressed mood. The patient is not nervous/anxious.        Vital Signs  Vitals:    09/01/20 1534   BP: 126/70   BP Location: Right arm   Patient Position: Sitting   Cuff Size: Adult   Pulse: 66   Temp: 97.1 °F (36.2 °C)   TempSrc: Temporal   Weight: 85.6 kg (188 lb 12.8 oz)   Height: 175.3 cm (69\")   PainSc: 0-No pain     Body mass index is 27.88 kg/m².    Labs  No visits with results within 3 Month(s) from this visit.   Latest known visit with results is:   Office Visit on 06/20/2019   Component Date Value Ref Range Status   • Glucose 06/20/2019 87  65 - 99 mg/dL Final   • BUN 06/20/2019 10  6 - 20 mg/dL Final   • Creatinine 06/20/2019 0.64  0.57 - 1.00 mg/dL Final   • Sodium 06/20/2019 136  136 - 145 mmol/L Final   • Potassium 06/20/2019 4.9  3.5 - 5.2 mmol/L Final   • Chloride 06/20/2019 99  98 - 107 mmol/L Final   • CO2 06/20/2019 24.4  22.0 - 29.0 mmol/L Final   • Calcium 06/20/2019 9.4  8.6 - 10.5 mg/dL Final   • Total Protein 06/20/2019 8.1  6.0 - 8.5 g/dL Final   • Albumin 06/20/2019 4.70  3.50 - 5.20 g/dL Final   • ALT (SGPT) 06/20/2019 11  1 - 33 U/L Final   • AST (SGOT) 06/20/2019 16  1 - 32 U/L Final   • Alkaline Phosphatase 06/20/2019 95  39 - 117 U/L Final   • Total Bilirubin 06/20/2019 0.6  0.2 - 1.2 mg/dL Final   • eGFR Non African Amer 06/20/2019 99  >60 mL/min/1.73 Final   • Globulin 06/20/2019 3.4  gm/dL Final   • A/G Ratio 06/20/2019 1.4  g/dL Final   • BUN/Creatinine Ratio 06/20/2019 15.6  7.0 - 25.0 Final   • Anion Gap 06/20/2019 12.6  mmol/L Final   • TSH 06/20/2019 0.863  0.270 - 4.200 mIU/mL Final   • Total Cholesterol 06/20/2019 215* 0 - 200 mg/dL Final   • Triglycerides 06/20/2019 55  0 - 150 mg/dL Final   • HDL Cholesterol 06/20/2019 72* 40 - 60 mg/dL Final   • LDL Cholesterol  06/20/2019 132* 0 - 100 mg/dL Final   • VLDL Cholesterol 06/20/2019 11  5 - 40 mg/dL Final   • " LDL/HDL Ratio 06/20/2019 1.83   Final   • Microalbumin, Urine 06/20/2019 <1.2  mg/L Final   • Sed Rate 06/20/2019 11  0 - 20 mm/hr Final   • FSH 06/20/2019 22.40  mIU/mL Final   • Hemoglobin A1C 06/20/2019 5.00  4.80 - 5.60 % Final   • 25 Hydroxy, Vitamin D 06/20/2019 28.2* 30.0 - 100.0 ng/ml Final   • Vitamin B-12 06/20/2019 524  211 - 946 pg/mL Final   • WBC 06/20/2019 6.15  3.40 - 10.80 10*3/mm3 Final   • RBC 06/20/2019 4.85  3.77 - 5.28 10*6/mm3 Final   • Hemoglobin 06/20/2019 14.3  12.0 - 15.9 g/dL Final   • Hematocrit 06/20/2019 46.2  34.0 - 46.6 % Final   • MCV 06/20/2019 95.3  79.0 - 97.0 fL Final   • MCH 06/20/2019 29.5  26.6 - 33.0 pg Final   • MCHC 06/20/2019 31.0* 31.5 - 35.7 g/dL Final   • RDW 06/20/2019 13.1  12.3 - 15.4 % Final   • RDW-SD 06/20/2019 45.9  37.0 - 54.0 fl Final   • MPV 06/20/2019 13.2* 6.0 - 12.0 fL Final   • Platelets 06/20/2019 219  140 - 450 10*3/mm3 Final   • Neutrophil % 06/20/2019 60.6  42.7 - 76.0 % Final   • Lymphocyte % 06/20/2019 30.9  19.6 - 45.3 % Final   • Monocyte % 06/20/2019 6.0  5.0 - 12.0 % Final   • Eosinophil % 06/20/2019 1.6  0.3 - 6.2 % Final   • Basophil % 06/20/2019 0.7  0.0 - 1.5 % Final   • Immature Grans % 06/20/2019 0.2  0.0 - 0.5 % Final   • Neutrophils, Absolute 06/20/2019 3.73  1.70 - 7.00 10*3/mm3 Final   • Lymphocytes, Absolute 06/20/2019 1.90  0.70 - 3.10 10*3/mm3 Final   • Monocytes, Absolute 06/20/2019 0.37  0.10 - 0.90 10*3/mm3 Final   • Eosinophils, Absolute 06/20/2019 0.10  0.00 - 0.40 10*3/mm3 Final   • Basophils, Absolute 06/20/2019 0.04  0.00 - 0.20 10*3/mm3 Final   • Immature Grans, Absolute 06/20/2019 0.01  0.00 - 0.05 10*3/mm3 Final   • nRBC 06/20/2019 0.0  0.0 - 0.2 /100 WBC Final   • WBC 06/20/2019 6.15  3.40 - 10.80 10*3/mm3 Final       Imaging  No radiology results for the last 30 days.      Current Outpatient Medications:   •  hydroCHLOROthiazide (HYDRODIURIL) 25 MG tablet, Take 1 tablet by mouth Daily., Disp: 30 tablet, Rfl: 3  •   lisinopril (PRINIVIL,ZESTRIL) 10 MG tablet, Take 1 tablet by mouth Daily., Disp: 90 tablet, Rfl: 1  •  Pediatric Multivitamins-Iron (FLINTSTONES COMPLETE PO), Take  by mouth Daily., Disp: , Rfl:   •  vitamin D (ERGOCALCIFEROL) 87834 units capsule capsule, Take 1 capsule by mouth 1 (One) Time Per Week., Disp: 4 capsule, Rfl: 3  •  FLUoxetine (PROzac) 10 MG tablet, Take for two weeks each month, Disp: 30 tablet, Rfl: 2    Physical Exam:    Physical Exam   Constitutional: She appears well-developed and well-nourished. No distress.   HENT:   Head: Normocephalic and atraumatic.   Right Ear: Hearing, tympanic membrane and ear canal normal.   Left Ear: Hearing, tympanic membrane and ear canal normal.   Mouth/Throat: Mucous membranes are normal. Normal dentition.   Eyes: Pupils are equal, round, and reactive to light. Conjunctivae are normal.   Suboptimal, undilated funduscopic exam without obvious abnormality.    Neck: Normal range of motion. Neck supple. No JVD present. Carotid bruit is not present. No thyroid mass and no thyromegaly present.   Cardiovascular: Normal rate, regular rhythm, S1 normal, S2 normal, normal heart sounds and intact distal pulses.   No murmur heard.  Pulmonary/Chest: Effort normal and breath sounds normal. No breast swelling, tenderness or discharge.   Abdominal: Soft. Normal appearance and bowel sounds are normal. She exhibits no distension, no abdominal bruit and no mass. There is no hepatosplenomegaly. There is no tenderness.   Genitourinary: Vagina normal and uterus normal. No vaginal discharge found.   Musculoskeletal: Normal range of motion. She exhibits no edema.   Lymphadenopathy:     She has no cervical adenopathy.     She has no axillary adenopathy.        Right: No inguinal and no supraclavicular adenopathy present.        Left: No inguinal and no supraclavicular adenopathy present.   Neurological: She is alert. She has normal strength. No cranial nerve deficit or sensory deficit. Gait  normal.   CN II-XII grossly intact/symmetric.    Skin: Skin is warm and dry. No rash noted. No cyanosis. Nails show no clubbing.   No suspicious lesions.    Psychiatric: She has a normal mood and affect. Her behavior is normal.   Nursing note and vitals reviewed.      Procedures        Assessment/Plan   Problem List Items Addressed This Visit        Cardiovascular and Mediastinum    Essential hypertension    Overview     Well controlled with lisinopril 10mg and hydrochlorothiazide 25mg tablet.         Current Assessment & Plan     Hypertension is improving with treatment.  Continue current treatment regimen.  Dietary sodium restriction.  Weight loss.  Regular aerobic exercise.  Continue current medications.  Blood pressure will be reassessed at the next regular appointment.         Relevant Medications    hydroCHLOROthiazide (HYDRODIURIL) 25 MG tablet    lisinopril (PRINIVIL,ZESTRIL) 10 MG tablet    Other Relevant Orders    CBC & Differential    Comprehensive Metabolic Panel       Digestive    Nausea    Overview     Nausea upon awakening. Discussed evening medications/supplements.  Discussed reflux or post nasal drainage.  We will check labs first.           Vitamin D deficiency    Relevant Medications    vitamin D (ERGOCALCIFEROL) 81291 units capsule capsule    Other Relevant Orders    Vitamin D 25 Hydroxy       Musculoskeletal and Integument    Rheumatoid arthritis (CMS/HCC)    Overview     Stable. Increase daily exercise.            Genitourinary    Irregular menses    Current Assessment & Plan     Likely perimenopausal.          Relevant Orders    TSH    T4, Free    Follicle Stimulating Hormone       Other    Routine gynecological examination    Current Assessment & Plan     Breast exam WNL  Pap and pelvic performed today.  MMG ordered.         Relevant Orders    Liquid-based Pap Smear, Screening    PMS (premenstrual syndrome) - Primary    Current Assessment & Plan     Trial of cyclic fluoxetine.  She is  instructed to take this two weeks out of the month. May increase this to daily, if needed.         Relevant Medications    FLUoxetine (PROzac) 10 MG tablet      Other Visit Diagnoses     Abnormal glucose        Relevant Orders    Hemoglobin A1c    Encounter for screening mammogram for breast cancer        Relevant Orders    Mammo Screening Digital Tomosynthesis Bilateral With CAD    Encounter for vitamin deficiency screening        Relevant Orders    Vitamin B12    Screening for lipid disorders        Relevant Orders    Lipid Panel        Patient Wellness Counseling:   Plan of care reviewed with patient at the conclusion of today's visit. Education was provided in regards to diagnosis, diet and exercise, cervical cancer screening, self breast exams, breast cancer screening, and the importance of yearly mammograms.   Nutrition, physical activity, healthy weight,ways to reduce stress, adequate sleep, injury prevention, dental health, mental health, and immunizations.    Management and any prescribed or recommended OTC medications.  Patient verbalizes understanding of and agreement with management plan.    Return in about 3 months (around 12/1/2020) for Recheck.    Plan of care reviewed with patient at the conclusion of today's visit. Education was provided regarding diagnosis, management, and any prescribed or recommended OTC medications.Patient verbalizes understanding of and agreement with management plan.       Aimee Levine PA-C    Please note that portions of this note were completed with a voice recognition program. Efforts were made to edit the dictations, but occasionally words are mistranscribed.

## 2020-09-02 PROBLEM — Z01.419 ROUTINE GYNECOLOGICAL EXAMINATION: Status: ACTIVE | Noted: 2020-09-02

## 2020-09-02 PROBLEM — N92.6 IRREGULAR MENSES: Status: ACTIVE | Noted: 2020-09-02

## 2020-09-02 PROBLEM — N94.3 PMS (PREMENSTRUAL SYNDROME): Status: ACTIVE | Noted: 2020-09-02

## 2020-09-02 NOTE — PATIENT INSTRUCTIONS

## 2020-09-02 NOTE — ASSESSMENT & PLAN NOTE
Trial of cyclic fluoxetine.  She is instructed to take this two weeks out of the month. May increase this to daily, if needed.

## 2020-09-09 ENCOUNTER — LAB (OUTPATIENT)
Dept: LAB | Facility: HOSPITAL | Age: 50
End: 2020-09-09

## 2020-09-09 DIAGNOSIS — Z13.220 SCREENING FOR LIPID DISORDERS: ICD-10-CM

## 2020-09-09 DIAGNOSIS — I10 ESSENTIAL HYPERTENSION: ICD-10-CM

## 2020-09-09 DIAGNOSIS — N92.6 IRREGULAR MENSES: ICD-10-CM

## 2020-09-09 DIAGNOSIS — Z13.21 ENCOUNTER FOR VITAMIN DEFICIENCY SCREENING: ICD-10-CM

## 2020-09-09 DIAGNOSIS — R73.09 ABNORMAL GLUCOSE: ICD-10-CM

## 2020-09-09 DIAGNOSIS — E55.9 VITAMIN D DEFICIENCY: ICD-10-CM

## 2020-09-09 LAB
25(OH)D3 SERPL-MCNC: 25.6 NG/ML (ref 30–100)
ALBUMIN SERPL-MCNC: 4.2 G/DL (ref 3.5–5.2)
ALBUMIN/GLOB SERPL: 1.5 G/DL
ALP SERPL-CCNC: 73 U/L (ref 39–117)
ALT SERPL W P-5'-P-CCNC: 10 U/L (ref 1–33)
ANION GAP SERPL CALCULATED.3IONS-SCNC: 9.1 MMOL/L (ref 5–15)
AST SERPL-CCNC: 13 U/L (ref 1–32)
BASOPHILS # BLD AUTO: 0.03 10*3/MM3 (ref 0–0.2)
BASOPHILS NFR BLD AUTO: 0.6 % (ref 0–1.5)
BILIRUB SERPL-MCNC: 0.3 MG/DL (ref 0–1.2)
BUN SERPL-MCNC: 11 MG/DL (ref 6–20)
BUN/CREAT SERPL: 15.5 (ref 7–25)
CALCIUM SPEC-SCNC: 8.9 MG/DL (ref 8.6–10.5)
CHLORIDE SERPL-SCNC: 105 MMOL/L (ref 98–107)
CHOLEST SERPL-MCNC: 180 MG/DL (ref 0–200)
CO2 SERPL-SCNC: 22.9 MMOL/L (ref 22–29)
CREAT SERPL-MCNC: 0.71 MG/DL (ref 0.57–1)
DEPRECATED RDW RBC AUTO: 42.1 FL (ref 37–54)
EOSINOPHIL # BLD AUTO: 0.16 10*3/MM3 (ref 0–0.4)
EOSINOPHIL NFR BLD AUTO: 3.3 % (ref 0.3–6.2)
ERYTHROCYTE [DISTWIDTH] IN BLOOD BY AUTOMATED COUNT: 12.7 % (ref 12.3–15.4)
FSH SERPL-ACNC: 7.51 MIU/ML
GFR SERPL CREATININE-BSD FRML MDRD: 87 ML/MIN/1.73
GLOBULIN UR ELPH-MCNC: 2.8 GM/DL
GLUCOSE SERPL-MCNC: 86 MG/DL (ref 65–99)
HBA1C MFR BLD: 5.1 % (ref 4.8–5.6)
HCT VFR BLD AUTO: 39.6 % (ref 34–46.6)
HDLC SERPL-MCNC: 56 MG/DL (ref 40–60)
HGB BLD-MCNC: 13.1 G/DL (ref 12–15.9)
IMM GRANULOCYTES # BLD AUTO: 0.01 10*3/MM3 (ref 0–0.05)
IMM GRANULOCYTES NFR BLD AUTO: 0.2 % (ref 0–0.5)
LDLC SERPL CALC-MCNC: 111 MG/DL (ref 0–100)
LDLC/HDLC SERPL: 1.99 {RATIO}
LYMPHOCYTES # BLD AUTO: 1.39 10*3/MM3 (ref 0.7–3.1)
LYMPHOCYTES NFR BLD AUTO: 28.5 % (ref 19.6–45.3)
MCH RBC QN AUTO: 29.6 PG (ref 26.6–33)
MCHC RBC AUTO-ENTMCNC: 33.1 G/DL (ref 31.5–35.7)
MCV RBC AUTO: 89.6 FL (ref 79–97)
MONOCYTES # BLD AUTO: 0.31 10*3/MM3 (ref 0.1–0.9)
MONOCYTES NFR BLD AUTO: 6.4 % (ref 5–12)
NEUTROPHILS NFR BLD AUTO: 2.98 10*3/MM3 (ref 1.7–7)
NEUTROPHILS NFR BLD AUTO: 61 % (ref 42.7–76)
NRBC BLD AUTO-RTO: 0 /100 WBC (ref 0–0.2)
PLATELET # BLD AUTO: 241 10*3/MM3 (ref 140–450)
PMV BLD AUTO: 12.9 FL (ref 6–12)
POTASSIUM SERPL-SCNC: 4.3 MMOL/L (ref 3.5–5.2)
PROT SERPL-MCNC: 7 G/DL (ref 6–8.5)
RBC # BLD AUTO: 4.42 10*6/MM3 (ref 3.77–5.28)
SODIUM SERPL-SCNC: 137 MMOL/L (ref 136–145)
T4 FREE SERPL-MCNC: 1.3 NG/DL (ref 0.93–1.7)
TRIGL SERPL-MCNC: 63 MG/DL (ref 0–150)
TSH SERPL DL<=0.05 MIU/L-ACNC: 1.19 UIU/ML (ref 0.27–4.2)
VIT B12 BLD-MCNC: 460 PG/ML (ref 211–946)
VLDLC SERPL-MCNC: 12.6 MG/DL (ref 5–40)
WBC # BLD AUTO: 4.88 10*3/MM3 (ref 3.4–10.8)

## 2020-09-09 PROCEDURE — 84443 ASSAY THYROID STIM HORMONE: CPT

## 2020-09-09 PROCEDURE — 85025 COMPLETE CBC W/AUTO DIFF WBC: CPT

## 2020-09-09 PROCEDURE — 84439 ASSAY OF FREE THYROXINE: CPT

## 2020-09-09 PROCEDURE — 82607 VITAMIN B-12: CPT

## 2020-09-09 PROCEDURE — 83036 HEMOGLOBIN GLYCOSYLATED A1C: CPT

## 2020-09-09 PROCEDURE — 80053 COMPREHEN METABOLIC PANEL: CPT

## 2020-09-09 PROCEDURE — 83001 ASSAY OF GONADOTROPIN (FSH): CPT

## 2020-09-09 PROCEDURE — 80061 LIPID PANEL: CPT

## 2020-09-09 PROCEDURE — 82306 VITAMIN D 25 HYDROXY: CPT

## 2020-09-15 DIAGNOSIS — E55.9 VITAMIN D DEFICIENCY: ICD-10-CM

## 2020-09-15 RX ORDER — ERGOCALCIFEROL 1.25 MG/1
50000 CAPSULE ORAL WEEKLY
Qty: 4 CAPSULE | Refills: 3 | Status: SHIPPED | OUTPATIENT
Start: 2020-09-15

## 2020-09-28 DIAGNOSIS — I10 ESSENTIAL HYPERTENSION: ICD-10-CM

## 2020-09-28 RX ORDER — HYDROCHLOROTHIAZIDE 25 MG/1
TABLET ORAL
Qty: 30 TABLET | Refills: 0 | Status: SHIPPED | OUTPATIENT
Start: 2020-09-28 | End: 2020-11-24

## 2020-11-24 DIAGNOSIS — I10 ESSENTIAL HYPERTENSION: ICD-10-CM

## 2020-11-24 RX ORDER — HYDROCHLOROTHIAZIDE 25 MG/1
TABLET ORAL
Qty: 30 TABLET | Refills: 4 | Status: SHIPPED | OUTPATIENT
Start: 2020-11-24 | End: 2021-02-26 | Stop reason: SDUPTHER

## 2020-12-22 DIAGNOSIS — I10 ESSENTIAL HYPERTENSION: ICD-10-CM

## 2020-12-22 RX ORDER — LISINOPRIL 10 MG/1
TABLET ORAL
Qty: 90 TABLET | Refills: 0 | Status: SHIPPED | OUTPATIENT
Start: 2020-12-22 | End: 2021-07-13

## 2020-12-29 ENCOUNTER — HOSPITAL ENCOUNTER (OUTPATIENT)
Dept: MAMMOGRAPHY | Facility: HOSPITAL | Age: 50
Discharge: HOME | End: 2020-12-29
Admitting: PHYSICIAN ASSISTANT

## 2020-12-29 DIAGNOSIS — Z12.31 ENCOUNTER FOR SCREENING MAMMOGRAM FOR BREAST CANCER: ICD-10-CM

## 2020-12-29 PROCEDURE — 77063 BREAST TOMOSYNTHESIS BI: CPT | Performed by: RADIOLOGY

## 2020-12-29 PROCEDURE — 77067 SCR MAMMO BI INCL CAD: CPT

## 2020-12-29 PROCEDURE — 77067 SCR MAMMO BI INCL CAD: CPT | Performed by: RADIOLOGY

## 2020-12-29 PROCEDURE — 77063 BREAST TOMOSYNTHESIS BI: CPT

## 2021-02-26 DIAGNOSIS — I10 ESSENTIAL HYPERTENSION: ICD-10-CM

## 2021-02-26 RX ORDER — HYDROCHLOROTHIAZIDE 25 MG/1
25 TABLET ORAL DAILY
Qty: 30 TABLET | Refills: 4 | Status: SHIPPED | OUTPATIENT
Start: 2021-02-26 | End: 2021-12-16

## 2021-04-09 ENCOUNTER — OFFICE VISIT (OUTPATIENT)
Dept: INTERNAL MEDICINE | Facility: CLINIC | Age: 51
End: 2021-04-09

## 2021-04-09 VITALS
SYSTOLIC BLOOD PRESSURE: 105 MMHG | WEIGHT: 191.2 LBS | HEIGHT: 69 IN | TEMPERATURE: 98.7 F | OXYGEN SATURATION: 99 % | DIASTOLIC BLOOD PRESSURE: 85 MMHG | BODY MASS INDEX: 28.32 KG/M2 | HEART RATE: 83 BPM

## 2021-04-09 DIAGNOSIS — H53.8 BLURRED VISION: ICD-10-CM

## 2021-04-09 DIAGNOSIS — R73.09 ABNORMAL GLUCOSE: ICD-10-CM

## 2021-04-09 DIAGNOSIS — N92.6 IRREGULAR MENSES: ICD-10-CM

## 2021-04-09 DIAGNOSIS — E55.9 VITAMIN D DEFICIENCY: ICD-10-CM

## 2021-04-09 DIAGNOSIS — R42 DIZZINESS: ICD-10-CM

## 2021-04-09 DIAGNOSIS — M05.9 RHEUMATOID ARTHRITIS WITH POSITIVE RHEUMATOID FACTOR, INVOLVING UNSPECIFIED SITE (HCC): ICD-10-CM

## 2021-04-09 DIAGNOSIS — I10 ESSENTIAL HYPERTENSION: Primary | ICD-10-CM

## 2021-04-09 PROCEDURE — 99214 OFFICE O/P EST MOD 30 MIN: CPT | Performed by: PHYSICIAN ASSISTANT

## 2021-04-09 NOTE — PATIENT INSTRUCTIONS
Drink more water.  Continue to eat fresh fruits and veggies  Purchase a fitbit or monitor to track steps per day. I need you to email me 2 week daily step amounts.  Check BP every morning.  Email me those numbers with steps in 2 weeks.

## 2021-04-09 NOTE — PROGRESS NOTES
Patient Care Team:  Aimee Levine PA-C as PCP - General (Internal Medicine)    Chief Complaint::   Chief Complaint   Patient presents with   • Dizziness     f/u    • Balance Issues     f/u         Subjective     HPI  Tanner is a 50 year old female with history of hypertension, vitamin D deficiency, rheumatoid arthritis, presents for evaluation of ongoing dizziness.  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  Reports drinking little water throughout the day, does not get enough exercise.   Dizzy episodes occur most days of the week.  She first thought it was the mask, because she did not have peripheral vision. But, she does experience it at home without mask.  She feels off balance.  She is concerned that she may catch her leg and trip. She has been using her hands to help guide her up stair.   She has fallen at work recently. She denies injury.  She denies vertigo, headache, tinnitus. Has had eye exam with optometrist.  She is compliant with her medications.          The following portions of the patient's history were reviewed and updated as appropriate: active problem list, medication list, allergies, family history, social history    Review of Systems:   Review of Systems   Constitutional: Negative for activity change, appetite change, diaphoresis, fatigue, unexpected weight gain and unexpected weight loss.   HENT: Negative for hearing loss.    Eyes: Negative for visual disturbance.   Respiratory: Negative for chest tightness and shortness of breath.    Cardiovascular: Negative for chest pain, palpitations and leg swelling.   Gastrointestinal: Positive for nausea. Negative for abdominal pain, blood in stool, GERD and indigestion.   Endocrine: Negative for cold intolerance and heat intolerance.   Genitourinary: Negative for dysuria and hematuria.   Musculoskeletal: Negative for arthralgias and myalgias.   Skin: Negative  "for skin lesions.   Neurological: Positive for dizziness and light-headedness. Negative for tremors, seizures, syncope, speech difficulty, weakness, headache, memory problem and confusion.   Hematological: Does not bruise/bleed easily.   Psychiatric/Behavioral: Negative for sleep disturbance and depressed mood. The patient is not nervous/anxious.        Vital Signs  Vitals:    04/09/21 1528   BP: 105/85   BP Location: Left arm   Patient Position: Sitting   Cuff Size: Adult   Pulse: 83   Temp: 98.7 °F (37.1 °C)   TempSrc: Temporal   SpO2: 99%   Weight: 86.7 kg (191 lb 3.2 oz)   Height: 175.3 cm (69.02\")   PainSc: 0-No pain     Body mass index is 28.22 kg/m².    Labs  No visits with results within 3 Month(s) from this visit.   Latest known visit with results is:   Lab on 09/09/2020   Component Date Value Ref Range Status   • TSH 09/09/2020 1.190  0.270 - 4.200 uIU/mL Final   • Free T4 09/09/2020 1.30  0.93 - 1.70 ng/dL Final   • Vitamin B-12 09/09/2020 460  211 - 946 pg/mL Final   • 25 Hydroxy, Vitamin D 09/09/2020 25.6* 30.0 - 100.0 ng/ml Final   • Glucose 09/09/2020 86  65 - 99 mg/dL Final   • BUN 09/09/2020 11  6 - 20 mg/dL Final   • Creatinine 09/09/2020 0.71  0.57 - 1.00 mg/dL Final   • Sodium 09/09/2020 137  136 - 145 mmol/L Final   • Potassium 09/09/2020 4.3  3.5 - 5.2 mmol/L Final   • Chloride 09/09/2020 105  98 - 107 mmol/L Final   • CO2 09/09/2020 22.9  22.0 - 29.0 mmol/L Final   • Calcium 09/09/2020 8.9  8.6 - 10.5 mg/dL Final   • Total Protein 09/09/2020 7.0  6.0 - 8.5 g/dL Final   • Albumin 09/09/2020 4.20  3.50 - 5.20 g/dL Final   • ALT (SGPT) 09/09/2020 10  1 - 33 U/L Final   • AST (SGOT) 09/09/2020 13  1 - 32 U/L Final   • Alkaline Phosphatase 09/09/2020 73  39 - 117 U/L Final   • Total Bilirubin 09/09/2020 0.3  0.0 - 1.2 mg/dL Final   • eGFR Non African Amer 09/09/2020 87  >60 mL/min/1.73 Final   • Globulin 09/09/2020 2.8  gm/dL Final   • A/G Ratio 09/09/2020 1.5  g/dL Final   • BUN/Creatinine Ratio " 09/09/2020 15.5  7.0 - 25.0 Final   • Anion Gap 09/09/2020 9.1  5.0 - 15.0 mmol/L Final   • Total Cholesterol 09/09/2020 180  0 - 200 mg/dL Final   • Triglycerides 09/09/2020 63  0 - 150 mg/dL Final   • HDL Cholesterol 09/09/2020 56  40 - 60 mg/dL Final   • LDL Cholesterol  09/09/2020 111* 0 - 100 mg/dL Final   • VLDL Cholesterol 09/09/2020 12.6  5 - 40 mg/dL Final   • LDL/HDL Ratio 09/09/2020 1.99   Final   • FSH 09/09/2020 7.51  mIU/mL Final   • Hemoglobin A1C 09/09/2020 5.10  4.80 - 5.60 % Final   • WBC 09/09/2020 4.88  3.40 - 10.80 10*3/mm3 Final   • RBC 09/09/2020 4.42  3.77 - 5.28 10*6/mm3 Final   • Hemoglobin 09/09/2020 13.1  12.0 - 15.9 g/dL Final   • Hematocrit 09/09/2020 39.6  34.0 - 46.6 % Final   • MCV 09/09/2020 89.6  79.0 - 97.0 fL Final   • MCH 09/09/2020 29.6  26.6 - 33.0 pg Final   • MCHC 09/09/2020 33.1  31.5 - 35.7 g/dL Final   • RDW 09/09/2020 12.7  12.3 - 15.4 % Final   • RDW-SD 09/09/2020 42.1  37.0 - 54.0 fl Final   • MPV 09/09/2020 12.9* 6.0 - 12.0 fL Final   • Platelets 09/09/2020 241  140 - 450 10*3/mm3 Final   • Neutrophil % 09/09/2020 61.0  42.7 - 76.0 % Final   • Lymphocyte % 09/09/2020 28.5  19.6 - 45.3 % Final   • Monocyte % 09/09/2020 6.4  5.0 - 12.0 % Final   • Eosinophil % 09/09/2020 3.3  0.3 - 6.2 % Final   • Basophil % 09/09/2020 0.6  0.0 - 1.5 % Final   • Immature Grans % 09/09/2020 0.2  0.0 - 0.5 % Final   • Neutrophils, Absolute 09/09/2020 2.98  1.70 - 7.00 10*3/mm3 Final   • Lymphocytes, Absolute 09/09/2020 1.39  0.70 - 3.10 10*3/mm3 Final   • Monocytes, Absolute 09/09/2020 0.31  0.10 - 0.90 10*3/mm3 Final   • Eosinophils, Absolute 09/09/2020 0.16  0.00 - 0.40 10*3/mm3 Final   • Basophils, Absolute 09/09/2020 0.03  0.00 - 0.20 10*3/mm3 Final   • Immature Grans, Absolute 09/09/2020 0.01  0.00 - 0.05 10*3/mm3 Final   • nRBC 09/09/2020 0.0  0.0 - 0.2 /100 WBC Final       Imaging  No radiology results for the last 30 days.      Current Outpatient Medications:   •   hydroCHLOROthiazide (HYDRODIURIL) 25 MG tablet, Take 1 tablet by mouth Daily., Disp: 30 tablet, Rfl: 4  •  lisinopril (PRINIVIL,ZESTRIL) 10 MG tablet, Take 1 tablet by mouth once daily, Disp: 90 tablet, Rfl: 0  •  Pediatric Multivitamins-Iron (FLINTSTONES COMPLETE PO), Take  by mouth Daily., Disp: , Rfl:   •  vitamin D (ERGOCALCIFEROL) 1.25 MG (48659 UT) capsule capsule, Take 1 capsule by mouth 1 (One) Time Per Week., Disp: 4 capsule, Rfl: 3    Physical Exam:    Physical Exam  Vitals and nursing note reviewed.   Constitutional:       Appearance: She is normal weight.   HENT:      Head: Normocephalic and atraumatic.      Right Ear: Tympanic membrane, ear canal and external ear normal.      Left Ear: Tympanic membrane, ear canal and external ear normal.      Nose: Nose normal.   Eyes:      Conjunctiva/sclera: Conjunctivae normal.      Pupils: Pupils are equal, round, and reactive to light.   Cardiovascular:      Rate and Rhythm: Normal rate and regular rhythm.      Pulses: Normal pulses.      Heart sounds: Normal heart sounds.   Pulmonary:      Effort: Pulmonary effort is normal.      Breath sounds: Normal breath sounds. No wheezing or rales.   Abdominal:      General: Abdomen is flat. Bowel sounds are normal.   Musculoskeletal:         General: Normal range of motion.      Cervical back: Normal range of motion and neck supple.   Skin:     General: Skin is warm and dry.   Neurological:      General: No focal deficit present.      Mental Status: She is alert. Mental status is at baseline.   Psychiatric:         Mood and Affect: Mood normal.         Thought Content: Thought content normal.         Judgment: Judgment normal.         Procedures        Assessment/Plan   Problem List Items Addressed This Visit        Cardiac and Vasculature    Essential hypertension - Primary    Overview     Well controlled with lisinopril 10mg and hydrochlorothiazide 25mg tablet.         Relevant Medications    lisinopril  (PRINIVIL,ZESTRIL) 10 MG tablet    hydroCHLOROthiazide (HYDRODIURIL) 25 MG tablet    Other Relevant Orders    CBC & Differential    Comprehensive Metabolic Panel       Endocrine and Metabolic    Vitamin D deficiency    Relevant Medications    vitamin D (ERGOCALCIFEROL) 1.25 MG (38100 UT) capsule capsule    Other Relevant Orders    Vitamin D 25 Hydroxy       Genitourinary and Reproductive     Irregular menses    Relevant Orders    Follicle Stimulating Hormone       Musculoskeletal and Injuries    Rheumatoid arthritis (CMS/HCC)    Overview     Stable. Increase daily exercise.         Relevant Orders    Sedimentation Rate       Symptoms and Signs    Dizziness    Overview     Labs placed,  Increase water intake.  She is encouraged to purchase a step/activity tracker.  She needs to increase movement and activity during the day.  Ophthalmology appt also scheduled.  May need to cut back on lisinopril.           Relevant Orders    TSH    T4, Free    Vitamin B12    Iron Profile      Other Visit Diagnoses     Abnormal glucose        Relevant Orders    Comprehensive Metabolic Panel    Blurred vision        Relevant Orders    Ambulatory Referral to Ophthalmology        Return in about 3 months (around 7/9/2021) for Recheck.    Plan of care reviewed with patient at the conclusion of today's visit. Education was provided regarding diagnosis, management, and any prescribed or recommended OTC medications.Patient verbalizes understanding of and agreement with management plan.       Aimee Levine PA-C    Please note that portions of this note were completed with a voice recognition program. Efforts were made to edit the dictations, but occasionally words are mistranscribed.

## 2021-04-11 PROBLEM — R42 DIZZINESS: Status: ACTIVE | Noted: 2021-04-11

## 2021-04-15 ENCOUNTER — LAB (OUTPATIENT)
Dept: LAB | Facility: HOSPITAL | Age: 51
End: 2021-04-15

## 2021-04-15 DIAGNOSIS — R42 DIZZINESS: ICD-10-CM

## 2021-04-15 DIAGNOSIS — I10 ESSENTIAL HYPERTENSION: ICD-10-CM

## 2021-04-15 DIAGNOSIS — E55.9 VITAMIN D DEFICIENCY: ICD-10-CM

## 2021-04-15 DIAGNOSIS — N92.6 IRREGULAR MENSES: ICD-10-CM

## 2021-04-15 DIAGNOSIS — R73.09 ABNORMAL GLUCOSE: ICD-10-CM

## 2021-04-15 DIAGNOSIS — M05.9 RHEUMATOID ARTHRITIS WITH POSITIVE RHEUMATOID FACTOR, INVOLVING UNSPECIFIED SITE (HCC): ICD-10-CM

## 2021-04-15 LAB
25(OH)D3 SERPL-MCNC: 26.5 NG/ML
ALBUMIN SERPL-MCNC: 4 G/DL (ref 3.5–5.2)
ALBUMIN/GLOB SERPL: 1.3 G/DL
ALP SERPL-CCNC: 86 U/L (ref 39–117)
ALT SERPL W P-5'-P-CCNC: 14 U/L (ref 1–33)
ANION GAP SERPL CALCULATED.3IONS-SCNC: 9.5 MMOL/L (ref 5–15)
AST SERPL-CCNC: 16 U/L (ref 1–32)
BASOPHILS # BLD AUTO: 0.04 10*3/MM3 (ref 0–0.2)
BASOPHILS NFR BLD AUTO: 0.8 % (ref 0–1.5)
BILIRUB SERPL-MCNC: 0.3 MG/DL (ref 0–1.2)
BUN SERPL-MCNC: 13 MG/DL (ref 6–20)
BUN/CREAT SERPL: 16.5 (ref 7–25)
CALCIUM SPEC-SCNC: 9.2 MG/DL (ref 8.6–10.5)
CHLORIDE SERPL-SCNC: 104 MMOL/L (ref 98–107)
CO2 SERPL-SCNC: 24.5 MMOL/L (ref 22–29)
CREAT SERPL-MCNC: 0.79 MG/DL (ref 0.57–1)
DEPRECATED RDW RBC AUTO: 41.2 FL (ref 37–54)
EOSINOPHIL # BLD AUTO: 0.21 10*3/MM3 (ref 0–0.4)
EOSINOPHIL NFR BLD AUTO: 4.2 % (ref 0.3–6.2)
ERYTHROCYTE [DISTWIDTH] IN BLOOD BY AUTOMATED COUNT: 12.6 % (ref 12.3–15.4)
ERYTHROCYTE [SEDIMENTATION RATE] IN BLOOD: 30 MM/HR (ref 0–20)
FSH SERPL-ACNC: 43.8 MIU/ML
GFR SERPL CREATININE-BSD FRML MDRD: 77 ML/MIN/1.73
GLOBULIN UR ELPH-MCNC: 3.1 GM/DL
GLUCOSE SERPL-MCNC: 92 MG/DL (ref 65–99)
HCT VFR BLD AUTO: 39.4 % (ref 34–46.6)
HGB BLD-MCNC: 13.8 G/DL (ref 12–15.9)
IMM GRANULOCYTES # BLD AUTO: 0.01 10*3/MM3 (ref 0–0.05)
IMM GRANULOCYTES NFR BLD AUTO: 0.2 % (ref 0–0.5)
IRON 24H UR-MRATE: 82 MCG/DL (ref 37–145)
IRON SATN MFR SERPL: 17 % (ref 20–50)
LYMPHOCYTES # BLD AUTO: 1.58 10*3/MM3 (ref 0.7–3.1)
LYMPHOCYTES NFR BLD AUTO: 31.5 % (ref 19.6–45.3)
MCH RBC QN AUTO: 31.2 PG (ref 26.6–33)
MCHC RBC AUTO-ENTMCNC: 35 G/DL (ref 31.5–35.7)
MCV RBC AUTO: 88.9 FL (ref 79–97)
MONOCYTES # BLD AUTO: 0.35 10*3/MM3 (ref 0.1–0.9)
MONOCYTES NFR BLD AUTO: 7 % (ref 5–12)
NEUTROPHILS NFR BLD AUTO: 2.83 10*3/MM3 (ref 1.7–7)
NEUTROPHILS NFR BLD AUTO: 56.3 % (ref 42.7–76)
NRBC BLD AUTO-RTO: 0 /100 WBC (ref 0–0.2)
PLATELET # BLD AUTO: 216 10*3/MM3 (ref 140–450)
PMV BLD AUTO: 12.6 FL (ref 6–12)
POTASSIUM SERPL-SCNC: 4.2 MMOL/L (ref 3.5–5.2)
PROT SERPL-MCNC: 7.1 G/DL (ref 6–8.5)
RBC # BLD AUTO: 4.43 10*6/MM3 (ref 3.77–5.28)
SODIUM SERPL-SCNC: 138 MMOL/L (ref 136–145)
T4 FREE SERPL-MCNC: 1.19 NG/DL (ref 0.93–1.7)
TIBC SERPL-MCNC: 493 MCG/DL (ref 298–536)
TRANSFERRIN SERPL-MCNC: 331 MG/DL (ref 200–360)
TSH SERPL DL<=0.05 MIU/L-ACNC: 1.41 UIU/ML (ref 0.27–4.2)
VIT B12 BLD-MCNC: 440 PG/ML (ref 211–946)
WBC # BLD AUTO: 5.02 10*3/MM3 (ref 3.4–10.8)

## 2021-04-15 PROCEDURE — 83540 ASSAY OF IRON: CPT

## 2021-04-15 PROCEDURE — 84439 ASSAY OF FREE THYROXINE: CPT

## 2021-04-15 PROCEDURE — 85025 COMPLETE CBC W/AUTO DIFF WBC: CPT

## 2021-04-15 PROCEDURE — 84443 ASSAY THYROID STIM HORMONE: CPT

## 2021-04-15 PROCEDURE — 82306 VITAMIN D 25 HYDROXY: CPT

## 2021-04-15 PROCEDURE — 84466 ASSAY OF TRANSFERRIN: CPT

## 2021-04-15 PROCEDURE — 83001 ASSAY OF GONADOTROPIN (FSH): CPT

## 2021-04-15 PROCEDURE — 82607 VITAMIN B-12: CPT

## 2021-04-15 PROCEDURE — 85652 RBC SED RATE AUTOMATED: CPT

## 2021-04-15 PROCEDURE — 80053 COMPREHEN METABOLIC PANEL: CPT

## 2021-04-27 ENCOUNTER — TELEPHONE (OUTPATIENT)
Dept: INTERNAL MEDICINE | Facility: CLINIC | Age: 51
End: 2021-04-27

## 2021-04-27 NOTE — TELEPHONE ENCOUNTER
Caller: Tanner Mills    Relationship: Self    Best call back number:     [phone]     What is the best time to reach you:     ANY TIME    Who are you requesting to speak:    MESSAGE FOR ROBERT PRADO    Do you know the name of the person who called:     N/A    What was the call regarding:     BLOOD PRESSURE  AND STEPS 4/12/21 THROUGH 4/25/21 AS REQUESTED BY ROBERT PRADO      4/12 - 104/87 - 3453    4/13 - 112/79 - 3306    4/14 - 114/88 - 4143    4/15 - 113/72 - 3034    4/16 - 92/76 - 3165    4/17 - 113/78 - 5284    4/18  -110/79 - 5469    4/19 - 108/83 - 3344    4/20 - 109/81 - 3356    4/21 - 103/80 - 6137    4/22 - 106/81 - 2574    4/23 - 102/78 - 3010    4/24 - 103/78 - 6163    4/25 - 101/71 - 4949    Do you require a callback:     YES. CALLER STATED SHE MAY BE REACHED VIA [employer] WITH A RESPONSE AS WELL    ROBERT PRADO PA-C

## 2021-07-13 ENCOUNTER — OFFICE VISIT (OUTPATIENT)
Dept: INTERNAL MEDICINE | Facility: CLINIC | Age: 51
End: 2021-07-13

## 2021-07-13 ENCOUNTER — LAB (OUTPATIENT)
Dept: LAB | Facility: HOSPITAL | Age: 51
End: 2021-07-13

## 2021-07-13 VITALS
TEMPERATURE: 97.7 F | HEART RATE: 69 BPM | HEIGHT: 69 IN | BODY MASS INDEX: 29.03 KG/M2 | DIASTOLIC BLOOD PRESSURE: 72 MMHG | SYSTOLIC BLOOD PRESSURE: 110 MMHG | WEIGHT: 196 LBS

## 2021-07-13 DIAGNOSIS — Z12.11 COLON CANCER SCREENING: ICD-10-CM

## 2021-07-13 DIAGNOSIS — R73.09 ABNORMAL GLUCOSE: ICD-10-CM

## 2021-07-13 DIAGNOSIS — E55.9 VITAMIN D DEFICIENCY: ICD-10-CM

## 2021-07-13 DIAGNOSIS — I10 ESSENTIAL HYPERTENSION: ICD-10-CM

## 2021-07-13 DIAGNOSIS — R42 DIZZINESS: ICD-10-CM

## 2021-07-13 DIAGNOSIS — M05.9 RHEUMATOID ARTHRITIS WITH POSITIVE RHEUMATOID FACTOR, INVOLVING UNSPECIFIED SITE (HCC): ICD-10-CM

## 2021-07-13 DIAGNOSIS — M79.10 MYALGIA: ICD-10-CM

## 2021-07-13 DIAGNOSIS — R60.0 LOCALIZED EDEMA: ICD-10-CM

## 2021-07-13 DIAGNOSIS — E55.9 VITAMIN D DEFICIENCY: Primary | ICD-10-CM

## 2021-07-13 LAB
25(OH)D3 SERPL-MCNC: 31.6 NG/ML
ALBUMIN SERPL-MCNC: 4.2 G/DL (ref 3.5–5.2)
ALBUMIN/GLOB SERPL: 1.4 G/DL
ALP SERPL-CCNC: 86 U/L (ref 39–117)
ALT SERPL W P-5'-P-CCNC: 10 U/L (ref 1–33)
ANION GAP SERPL CALCULATED.3IONS-SCNC: 8.7 MMOL/L (ref 5–15)
AST SERPL-CCNC: 13 U/L (ref 1–32)
BASOPHILS # BLD AUTO: 0.03 10*3/MM3 (ref 0–0.2)
BASOPHILS NFR BLD AUTO: 0.5 % (ref 0–1.5)
BILIRUB SERPL-MCNC: 0.3 MG/DL (ref 0–1.2)
BUN SERPL-MCNC: 12 MG/DL (ref 6–20)
BUN/CREAT SERPL: 20 (ref 7–25)
CALCIUM SPEC-SCNC: 8.9 MG/DL (ref 8.6–10.5)
CHLORIDE SERPL-SCNC: 104 MMOL/L (ref 98–107)
CHROMATIN AB SERPL-ACNC: <10 IU/ML (ref 0–14)
CO2 SERPL-SCNC: 25.3 MMOL/L (ref 22–29)
CREAT SERPL-MCNC: 0.6 MG/DL (ref 0.57–1)
CRP SERPL-MCNC: <0.3 MG/DL (ref 0–0.5)
DEPRECATED RDW RBC AUTO: 40.6 FL (ref 37–54)
EOSINOPHIL # BLD AUTO: 0.1 10*3/MM3 (ref 0–0.4)
EOSINOPHIL NFR BLD AUTO: 1.8 % (ref 0.3–6.2)
ERYTHROCYTE [DISTWIDTH] IN BLOOD BY AUTOMATED COUNT: 12.4 % (ref 12.3–15.4)
ERYTHROCYTE [SEDIMENTATION RATE] IN BLOOD: 34 MM/HR (ref 0–20)
GFR SERPL CREATININE-BSD FRML MDRD: 106 ML/MIN/1.73
GLOBULIN UR ELPH-MCNC: 3.1 GM/DL
GLUCOSE SERPL-MCNC: 92 MG/DL (ref 65–99)
HCT VFR BLD AUTO: 42.8 % (ref 34–46.6)
HGB BLD-MCNC: 14.1 G/DL (ref 12–15.9)
IMM GRANULOCYTES # BLD AUTO: 0.01 10*3/MM3 (ref 0–0.05)
IMM GRANULOCYTES NFR BLD AUTO: 0.2 % (ref 0–0.5)
LYMPHOCYTES # BLD AUTO: 1.78 10*3/MM3 (ref 0.7–3.1)
LYMPHOCYTES NFR BLD AUTO: 31.7 % (ref 19.6–45.3)
MCH RBC QN AUTO: 29.4 PG (ref 26.6–33)
MCHC RBC AUTO-ENTMCNC: 32.9 G/DL (ref 31.5–35.7)
MCV RBC AUTO: 89.2 FL (ref 79–97)
MONOCYTES # BLD AUTO: 0.39 10*3/MM3 (ref 0.1–0.9)
MONOCYTES NFR BLD AUTO: 7 % (ref 5–12)
NEUTROPHILS NFR BLD AUTO: 3.3 10*3/MM3 (ref 1.7–7)
NEUTROPHILS NFR BLD AUTO: 58.8 % (ref 42.7–76)
NRBC BLD AUTO-RTO: 0 /100 WBC (ref 0–0.2)
PLATELET # BLD AUTO: 255 10*3/MM3 (ref 140–450)
PMV BLD AUTO: 12.8 FL (ref 6–12)
POTASSIUM SERPL-SCNC: 4 MMOL/L (ref 3.5–5.2)
PROT SERPL-MCNC: 7.3 G/DL (ref 6–8.5)
RBC # BLD AUTO: 4.8 10*6/MM3 (ref 3.77–5.28)
SODIUM SERPL-SCNC: 138 MMOL/L (ref 136–145)
T4 FREE SERPL-MCNC: 0.97 NG/DL (ref 0.93–1.7)
TSH SERPL DL<=0.05 MIU/L-ACNC: 1.16 UIU/ML (ref 0.27–4.2)
WBC # BLD AUTO: 5.61 10*3/MM3 (ref 3.4–10.8)

## 2021-07-13 PROCEDURE — 86038 ANTINUCLEAR ANTIBODIES: CPT

## 2021-07-13 PROCEDURE — 85025 COMPLETE CBC W/AUTO DIFF WBC: CPT

## 2021-07-13 PROCEDURE — 86431 RHEUMATOID FACTOR QUANT: CPT

## 2021-07-13 PROCEDURE — 82306 VITAMIN D 25 HYDROXY: CPT

## 2021-07-13 PROCEDURE — 84443 ASSAY THYROID STIM HORMONE: CPT

## 2021-07-13 PROCEDURE — 85652 RBC SED RATE AUTOMATED: CPT

## 2021-07-13 PROCEDURE — 84439 ASSAY OF FREE THYROXINE: CPT

## 2021-07-13 PROCEDURE — 99214 OFFICE O/P EST MOD 30 MIN: CPT | Performed by: PHYSICIAN ASSISTANT

## 2021-07-13 PROCEDURE — 86140 C-REACTIVE PROTEIN: CPT

## 2021-07-13 PROCEDURE — 80053 COMPREHEN METABOLIC PANEL: CPT

## 2021-07-13 RX ORDER — LISINOPRIL 5 MG/1
5 TABLET ORAL DAILY
Qty: 90 TABLET | Refills: 1 | Status: SHIPPED | OUTPATIENT
Start: 2021-07-13 | End: 2022-10-03

## 2021-07-13 NOTE — PROGRESS NOTES
Patient Care Team:  Aimee Levine PA-C as PCP - General (Internal Medicine)    Chief Complaint::   Chief Complaint   Patient presents with   • Hypertension     follow up         Subjective     HPI  Tanner is a 51 year old female with history of hypertension, vitamin D deficiency, rheumatoid arthritis, and dizziness.  She was last seen in the office on 4/9/2021 for dizzy episodes.  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  She was not drinking enough fluids and was getting little to no exercise daily.  At her last appointment, she was encouraged to increase water intake and increase exercise.  Her blood pressure medication was adjusted, and she decrease lisinopril to 5 mg along with hydrochlorothiazide 25 mg tablets daily.    Today, she reports weight gain despite increasing exercise.  She tries to drink water throughout the day.  She has been walking 1 mile every morning before work for the past 3 weeks.  She has changed her work schedule with the end of June, which allows her to leave earlier at 5:00-6:00PM and now she does not works weekends.  Her menses continue to be sporadic, occurring every 2 to 3 months.  At her last appointment, FSH was found to be greater than 40.  She has had very few dizzy episodes since last appointment.  She reports history of rheumatoid arthritis, diagnosed with blood work by GP years ago.  Labs in April did show elevated sed rate.  She continues to have swelling and some ankle edema.  Feels tired and fatigue, even with the reduction in work hours.  She denies chest pain, shortness of breath, palpitations, or headaches.          The following portions of the patient's history were reviewed and updated as appropriate: active problem list, medication list, allergies, family history, social history    Review of Systems:   Review of Systems   Constitutional: Positive for unexpected weight gain. Negative for activity change, appetite change,  "diaphoresis, fatigue and unexpected weight loss.   HENT: Negative for hearing loss.    Eyes: Negative for visual disturbance.   Respiratory: Negative for chest tightness and shortness of breath.    Cardiovascular: Positive for leg swelling. Negative for chest pain and palpitations.   Gastrointestinal: Negative for abdominal pain, blood in stool, GERD and indigestion.   Endocrine: Negative for cold intolerance and heat intolerance.   Genitourinary: Negative for dysuria and hematuria.   Musculoskeletal: Positive for myalgias. Negative for arthralgias.   Skin: Negative for skin lesions.   Neurological: Negative for tremors, seizures, syncope, speech difficulty, weakness, headache, memory problem and confusion.   Hematological: Does not bruise/bleed easily.   Psychiatric/Behavioral: Negative for sleep disturbance and depressed mood. The patient is not nervous/anxious.        Vital Signs  Vitals:    07/13/21 1035   BP: 110/72   BP Location: Right arm   Patient Position: Sitting   Cuff Size: Adult   Pulse: 69   Temp: 97.7 °F (36.5 °C)   TempSrc: Temporal   Weight: 88.9 kg (196 lb)   Height: 175.3 cm (69.02\")   PainSc:   3     Body mass index is 28.93 kg/m².    Labs  Lab on 04/15/2021   Component Date Value Ref Range Status   • Glucose 04/15/2021 92  65 - 99 mg/dL Final   • BUN 04/15/2021 13  6 - 20 mg/dL Final   • Creatinine 04/15/2021 0.79  0.57 - 1.00 mg/dL Final   • Sodium 04/15/2021 138  136 - 145 mmol/L Final   • Potassium 04/15/2021 4.2  3.5 - 5.2 mmol/L Final   • Chloride 04/15/2021 104  98 - 107 mmol/L Final   • CO2 04/15/2021 24.5  22.0 - 29.0 mmol/L Final   • Calcium 04/15/2021 9.2  8.6 - 10.5 mg/dL Final   • Total Protein 04/15/2021 7.1  6.0 - 8.5 g/dL Final   • Albumin 04/15/2021 4.00  3.50 - 5.20 g/dL Final   • ALT (SGPT) 04/15/2021 14  1 - 33 U/L Final   • AST (SGOT) 04/15/2021 16  1 - 32 U/L Final   • Alkaline Phosphatase 04/15/2021 86  39 - 117 U/L Final   • Total Bilirubin 04/15/2021 0.3  0.0 - 1.2 mg/dL " Final   • eGFR Non  Amer 04/15/2021 77  >60 mL/min/1.73 Final   • Globulin 04/15/2021 3.1  gm/dL Final   • A/G Ratio 04/15/2021 1.3  g/dL Final   • BUN/Creatinine Ratio 04/15/2021 16.5  7.0 - 25.0 Final   • Anion Gap 04/15/2021 9.5  5.0 - 15.0 mmol/L Final   • TSH 04/15/2021 1.410  0.270 - 4.200 uIU/mL Final   • Free T4 04/15/2021 1.19  0.93 - 1.70 ng/dL Final   • 25 Hydroxy, Vitamin D 04/15/2021 26.5  ng/ml Final   • Vitamin B-12 04/15/2021 440  211 - 946 pg/mL Final   • Iron 04/15/2021 82  37 - 145 mcg/dL Final   • Iron Saturation 04/15/2021 17* 20 - 50 % Final   • Transferrin 04/15/2021 331  200 - 360 mg/dL Final   • TIBC 04/15/2021 493  298 - 536 mcg/dL Final   • Sed Rate 04/15/2021 30* 0 - 20 mm/hr Final   • FSH 04/15/2021 43.80  mIU/mL Final   • WBC 04/15/2021 5.02  3.40 - 10.80 10*3/mm3 Final   • RBC 04/15/2021 4.43  3.77 - 5.28 10*6/mm3 Final   • Hemoglobin 04/15/2021 13.8  12.0 - 15.9 g/dL Final   • Hematocrit 04/15/2021 39.4  34.0 - 46.6 % Final   • MCV 04/15/2021 88.9  79.0 - 97.0 fL Final   • MCH 04/15/2021 31.2  26.6 - 33.0 pg Final   • MCHC 04/15/2021 35.0  31.5 - 35.7 g/dL Final   • RDW 04/15/2021 12.6  12.3 - 15.4 % Final   • RDW-SD 04/15/2021 41.2  37.0 - 54.0 fl Final   • MPV 04/15/2021 12.6* 6.0 - 12.0 fL Final   • Platelets 04/15/2021 216  140 - 450 10*3/mm3 Final   • Neutrophil % 04/15/2021 56.3  42.7 - 76.0 % Final   • Lymphocyte % 04/15/2021 31.5  19.6 - 45.3 % Final   • Monocyte % 04/15/2021 7.0  5.0 - 12.0 % Final   • Eosinophil % 04/15/2021 4.2  0.3 - 6.2 % Final   • Basophil % 04/15/2021 0.8  0.0 - 1.5 % Final   • Immature Grans % 04/15/2021 0.2  0.0 - 0.5 % Final   • Neutrophils, Absolute 04/15/2021 2.83  1.70 - 7.00 10*3/mm3 Final   • Lymphocytes, Absolute 04/15/2021 1.58  0.70 - 3.10 10*3/mm3 Final   • Monocytes, Absolute 04/15/2021 0.35  0.10 - 0.90 10*3/mm3 Final   • Eosinophils, Absolute 04/15/2021 0.21  0.00 - 0.40 10*3/mm3 Final   • Basophils, Absolute 04/15/2021 0.04  0.00  - 0.20 10*3/mm3 Final   • Immature Grans, Absolute 04/15/2021 0.01  0.00 - 0.05 10*3/mm3 Final   • nRBC 04/15/2021 0.0  0.0 - 0.2 /100 WBC Final       Imaging  No radiology results for the last 30 days.      Current Outpatient Medications:   •  hydroCHLOROthiazide (HYDRODIURIL) 25 MG tablet, Take 1 tablet by mouth Daily., Disp: 30 tablet, Rfl: 4  •  lisinopril (PRINIVIL,ZESTRIL) 5 MG tablet, Take 1 tablet by mouth Daily. Taking half tablet, Disp: 90 tablet, Rfl: 1  •  Pediatric Multivitamins-Iron (FLINTSTONES COMPLETE PO), Take  by mouth Daily., Disp: , Rfl:   •  vitamin D (ERGOCALCIFEROL) 1.25 MG (28661 UT) capsule capsule, Take 1 capsule by mouth 1 (One) Time Per Week., Disp: 4 capsule, Rfl: 3    Physical Exam:    Physical Exam  Vitals reviewed.   Constitutional:       Appearance: Normal appearance. She is well-developed.   HENT:      Head: Normocephalic and atraumatic.      Right Ear: Hearing, tympanic membrane, ear canal and external ear normal.      Left Ear: Hearing, tympanic membrane, ear canal and external ear normal.      Nose: Nose normal.      Mouth/Throat:      Pharynx: Uvula midline.   Eyes:      General: Lids are normal.      Conjunctiva/sclera: Conjunctivae normal.      Pupils: Pupils are equal, round, and reactive to light.   Cardiovascular:      Rate and Rhythm: Normal rate and regular rhythm.      Heart sounds: Normal heart sounds.   Pulmonary:      Effort: Pulmonary effort is normal.      Breath sounds: Normal breath sounds.   Abdominal:      General: Bowel sounds are normal.      Palpations: Abdomen is soft.   Musculoskeletal:         General: Tenderness present. Normal range of motion.      Cervical back: Full passive range of motion without pain, normal range of motion and neck supple.   Skin:     General: Skin is warm and dry.      Findings: Erythema present.   Neurological:      Mental Status: She is alert and oriented to person, place, and time.      Deep Tendon Reflexes: Reflexes are normal  and symmetric.   Psychiatric:         Speech: Speech normal.         Behavior: Behavior normal.         Thought Content: Thought content normal.         Judgment: Judgment normal.         Procedures        Assessment/Plan   Problem List Items Addressed This Visit        Cardiac and Vasculature    Essential hypertension    Overview     Well controlled with lisinopril 5mg and hydrochlorothiazide 25mg tablet.         Relevant Medications    hydroCHLOROthiazide (HYDRODIURIL) 25 MG tablet    lisinopril (PRINIVIL,ZESTRIL) 5 MG tablet    Other Relevant Orders    CBC & Differential    Comprehensive Metabolic Panel       Endocrine and Metabolic    Vitamin D deficiency - Primary    Relevant Medications    vitamin D (ERGOCALCIFEROL) 1.25 MG (72422 UT) capsule capsule    Other Relevant Orders    Vitamin D 25 Hydroxy       Musculoskeletal and Injuries    Rheumatoid arthritis (CMS/HCC)    Overview     Increase in pain and inflammation with increase in exercise.  We will recheck labs today.  If abnormal, refer to rheumatology.  This may be the cause of her fatigue and pain.         Relevant Orders    Sedimentation Rate    Rheumatoid Factor    Myalgia    Relevant Orders    TSH    T4, Free    C-reactive Protein    Nuclear Antigen Antibody, IFA       Symptoms and Signs    Dizziness    Overview     Improving with decrease in lisinopril, increased fluid intake throughout the day, and reduction in work schedule.  We will continue to monitor.           Other Visit Diagnoses     Abnormal glucose        Localized edema        Colon cancer screening        Relevant Orders    Ambulatory Referral For Screening Colonoscopy (Completed)          Return in about 3 months (around 10/13/2021) for Recheck.    Plan of care reviewed with patient at the conclusion of today's visit. Education was provided regarding diagnosis, management, and any prescribed or recommended OTC medications.Patient verbalizes understanding of and agreement with management  plan.       Aimee Levine PA-C    Please note that portions of this note were completed with a voice recognition program. Efforts were made to edit the dictations, but occasionally words are mistranscribed.

## 2021-07-14 LAB — ANA TITR SER IF: NEGATIVE {TITER}

## 2021-07-18 DIAGNOSIS — M79.10 MYALGIA: Primary | ICD-10-CM

## 2021-07-18 DIAGNOSIS — R42 DIZZINESS: ICD-10-CM

## 2021-07-18 RX ORDER — PREDNISONE 10 MG/1
TABLET ORAL
Qty: 30 TABLET | Refills: 0 | Status: SHIPPED | OUTPATIENT
Start: 2021-07-18 | End: 2021-10-18

## 2021-08-06 ENCOUNTER — TELEPHONE (OUTPATIENT)
Dept: INTERNAL MEDICINE | Facility: CLINIC | Age: 51
End: 2021-08-06

## 2021-08-06 NOTE — TELEPHONE ENCOUNTER
Called and discussed with .  He reports Tanner is having swelling in her ankles, extreme fatigue, very hormonal.  Wakes up with headache every morning.  Recommend Tanner checks blood pressures every morning and reports them.  I need her to call me with update next Friday.  Her rheumatology studies were negative, although her sed rate was elevated.  I will likely need to repeat her sed rate.

## 2021-08-06 NOTE — TELEPHONE ENCOUNTER
Caller: [place]    Relationship: Emergency Contact    Best call back number:     What is the best time to reach you: ANYTIME    Who are you requesting to speak with (clinical staff, provider,  specific staff member): ROBERT    Do you know the name of the person who called: OSMAN    What was the call regarding: WOULD NOT SAY, JUST WANTS A CALL BACK    Do you require a callback: YES

## 2021-10-18 ENCOUNTER — OFFICE VISIT (OUTPATIENT)
Dept: INTERNAL MEDICINE | Facility: CLINIC | Age: 51
End: 2021-10-18

## 2021-10-18 VITALS
TEMPERATURE: 98.6 F | HEART RATE: 78 BPM | WEIGHT: 196 LBS | SYSTOLIC BLOOD PRESSURE: 130 MMHG | HEIGHT: 69 IN | BODY MASS INDEX: 29.03 KG/M2 | DIASTOLIC BLOOD PRESSURE: 88 MMHG

## 2021-10-18 DIAGNOSIS — M79.10 MYALGIA: ICD-10-CM

## 2021-10-18 DIAGNOSIS — Z12.11 COLON CANCER SCREENING: Primary | ICD-10-CM

## 2021-10-18 DIAGNOSIS — R42 DIZZINESS: ICD-10-CM

## 2021-10-18 DIAGNOSIS — E55.9 VITAMIN D DEFICIENCY: ICD-10-CM

## 2021-10-18 DIAGNOSIS — N92.6 IRREGULAR MENSES: ICD-10-CM

## 2021-10-18 PROCEDURE — 90471 IMMUNIZATION ADMIN: CPT | Performed by: PHYSICIAN ASSISTANT

## 2021-10-18 PROCEDURE — 90715 TDAP VACCINE 7 YRS/> IM: CPT | Performed by: PHYSICIAN ASSISTANT

## 2021-10-18 PROCEDURE — 99214 OFFICE O/P EST MOD 30 MIN: CPT | Performed by: PHYSICIAN ASSISTANT

## 2021-10-18 RX ORDER — CYCLOBENZAPRINE HCL 10 MG
10 TABLET ORAL NIGHTLY PRN
Qty: 30 TABLET | Refills: 1 | Status: SHIPPED | OUTPATIENT
Start: 2021-10-18 | End: 2022-02-18

## 2021-10-18 NOTE — PROGRESS NOTES
Patient Care Team:  Aimee Levine PA-C as PCP - General (Internal Medicine)    Chief Complaint::   Chief Complaint   Patient presents with   • Vitamin D Deficiency     follow up         Subjective     HPI  Tanner is a 51 year old female with history of hypertension, vitamin D deficiency, and dizziness, who presents for follow-up.  She was seen in the office on 4/9/2021 for work-up for dizzy episodes.  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  She was not drinking of fluids and was getting little to no exercise daily.  She was encouraged to increase her water intake and try to increase exercise throughout the day.  Blood pressure medications were adjusted, we decrease lisinopril to 5 mg and increased hydrochlorothiazide to 25 mg daily.  Follow-up on 7/13/2021, patient expressed irregular menstrual cycle, joint pain, and few dizzy spells, but 5 pound weight gain.  She had increased her steps daily and was trying to walk a mile before work most days of the week.  She had started leaving her job earlier, 5 and 6 PM.  Stopped working on the weekends.  Labs showed elevation in sed rate.  She did have questionable history of rheumatoid arthritis before, and short course of prednisone was initiated.   Today, patient reports continued weight gain.  She does try to drink water throughout the day.  She has been walking for 20 minutes every day prior to work and also doing 20 minutes of yoga before bed.  She did change her work schedule at the end of June, but now working 12-hour days and weekends.  At last appointment, her FSH was found to be greater than 70.  Her last menstrual period May or June.  She is experiencing hot flashes.  Hesitant to start any type of hormone replacement therapy due to her mother having estrogen receptor positive breast cancer.  She does have tightness in her neck after long days.  Has used cyclobenzaprine in the past with excellent results.  She denies  "chest pain, shortness of breath, palpitations, or headaches.        The following portions of the patient's history were reviewed and updated as appropriate: active problem list, medication list, allergies, family history, social history    Review of Systems:   Review of Systems   Constitutional: Negative for activity change, appetite change, diaphoresis, fatigue, unexpected weight gain and unexpected weight loss.   HENT: Negative for hearing loss.    Eyes: Negative for blurred vision, double vision and visual disturbance.   Respiratory: Negative for chest tightness and shortness of breath.    Cardiovascular: Negative for chest pain, palpitations and leg swelling.   Gastrointestinal: Negative for abdominal pain, blood in stool, GERD and indigestion.   Endocrine: Positive for heat intolerance. Negative for cold intolerance.   Genitourinary: Positive for menstrual problem. Negative for breast discharge, breast lump, dysuria and hematuria.   Musculoskeletal: Negative for arthralgias and myalgias.   Skin: Negative for skin lesions.   Neurological: Negative for tremors, seizures, syncope, speech difficulty, weakness, headache, memory problem and confusion.   Hematological: Does not bruise/bleed easily.   Psychiatric/Behavioral: Negative for sleep disturbance and depressed mood. The patient is not nervous/anxious.        Vital Signs  Vitals:    10/18/21 1034   BP: 130/88   BP Location: Left arm   Patient Position: Sitting   Cuff Size: Adult   Pulse: 78   Temp: 98.6 °F (37 °C)   TempSrc: Temporal   Weight: 88.9 kg (196 lb)   Height: 175.3 cm (69.02\")   PainSc:   3     Body mass index is 28.93 kg/m².    Labs  No visits with results within 3 Month(s) from this visit.   Latest known visit with results is:   Lab on 07/13/2021   Component Date Value Ref Range Status   • Sed Rate 07/13/2021 34* 0 - 20 mm/hr Final   • Rheumatoid Factor Quantitative 07/13/2021 <10.0  0.0 - 14.0 IU/mL Final   • Glucose 07/13/2021 92  65 - 99 mg/dL " Final   • BUN 07/13/2021 12  6 - 20 mg/dL Final   • Creatinine 07/13/2021 0.60  0.57 - 1.00 mg/dL Final   • Sodium 07/13/2021 138  136 - 145 mmol/L Final   • Potassium 07/13/2021 4.0  3.5 - 5.2 mmol/L Final   • Chloride 07/13/2021 104  98 - 107 mmol/L Final   • CO2 07/13/2021 25.3  22.0 - 29.0 mmol/L Final   • Calcium 07/13/2021 8.9  8.6 - 10.5 mg/dL Final   • Total Protein 07/13/2021 7.3  6.0 - 8.5 g/dL Final   • Albumin 07/13/2021 4.20  3.50 - 5.20 g/dL Final   • ALT (SGPT) 07/13/2021 10  1 - 33 U/L Final   • AST (SGOT) 07/13/2021 13  1 - 32 U/L Final   • Alkaline Phosphatase 07/13/2021 86  39 - 117 U/L Final   • Total Bilirubin 07/13/2021 0.3  0.0 - 1.2 mg/dL Final   • eGFR Non African Amer 07/13/2021 106  >60 mL/min/1.73 Final   • Globulin 07/13/2021 3.1  gm/dL Final   • A/G Ratio 07/13/2021 1.4  g/dL Final   • BUN/Creatinine Ratio 07/13/2021 20.0  7.0 - 25.0 Final   • Anion Gap 07/13/2021 8.7  5.0 - 15.0 mmol/L Final   • 25 Hydroxy, Vitamin D 07/13/2021 31.6  ng/ml Final   • TSH 07/13/2021 1.160  0.270 - 4.200 uIU/mL Final   • Free T4 07/13/2021 0.97  0.93 - 1.70 ng/dL Final   • C-Reactive Protein 07/13/2021 <0.30  0.00 - 0.50 mg/dL Final   • STANISLAW 07/13/2021 Negative   Final                                         Negative   <1:80                                       Borderline  1:80                                       Positive   >1:80   • WBC 07/13/2021 5.61  3.40 - 10.80 10*3/mm3 Final   • RBC 07/13/2021 4.80  3.77 - 5.28 10*6/mm3 Final   • Hemoglobin 07/13/2021 14.1  12.0 - 15.9 g/dL Final   • Hematocrit 07/13/2021 42.8  34.0 - 46.6 % Final   • MCV 07/13/2021 89.2  79.0 - 97.0 fL Final   • MCH 07/13/2021 29.4  26.6 - 33.0 pg Final   • MCHC 07/13/2021 32.9  31.5 - 35.7 g/dL Final   • RDW 07/13/2021 12.4  12.3 - 15.4 % Final   • RDW-SD 07/13/2021 40.6  37.0 - 54.0 fl Final   • MPV 07/13/2021 12.8* 6.0 - 12.0 fL Final   • Platelets 07/13/2021 255  140 - 450 10*3/mm3 Final   • Neutrophil % 07/13/2021 58.8   42.7 - 76.0 % Final   • Lymphocyte % 07/13/2021 31.7  19.6 - 45.3 % Final   • Monocyte % 07/13/2021 7.0  5.0 - 12.0 % Final   • Eosinophil % 07/13/2021 1.8  0.3 - 6.2 % Final   • Basophil % 07/13/2021 0.5  0.0 - 1.5 % Final   • Immature Grans % 07/13/2021 0.2  0.0 - 0.5 % Final   • Neutrophils, Absolute 07/13/2021 3.30  1.70 - 7.00 10*3/mm3 Final   • Lymphocytes, Absolute 07/13/2021 1.78  0.70 - 3.10 10*3/mm3 Final   • Monocytes, Absolute 07/13/2021 0.39  0.10 - 0.90 10*3/mm3 Final   • Eosinophils, Absolute 07/13/2021 0.10  0.00 - 0.40 10*3/mm3 Final   • Basophils, Absolute 07/13/2021 0.03  0.00 - 0.20 10*3/mm3 Final   • Immature Grans, Absolute 07/13/2021 0.01  0.00 - 0.05 10*3/mm3 Final   • nRBC 07/13/2021 0.0  0.0 - 0.2 /100 WBC Final       Imaging  No radiology results for the last 30 days.      Current Outpatient Medications:   •  hydroCHLOROthiazide (HYDRODIURIL) 25 MG tablet, Take 1 tablet by mouth Daily., Disp: 30 tablet, Rfl: 4  •  lisinopril (PRINIVIL,ZESTRIL) 5 MG tablet, Take 1 tablet by mouth Daily. Taking half tablet, Disp: 90 tablet, Rfl: 1  •  Pediatric Multivitamins-Iron (FLINTSTONES COMPLETE PO), Take  by mouth Daily., Disp: , Rfl:   •  vitamin D (ERGOCALCIFEROL) 1.25 MG (86545 UT) capsule capsule, Take 1 capsule by mouth 1 (One) Time Per Week., Disp: 4 capsule, Rfl: 3  •  cyclobenzaprine (FLEXERIL) 10 MG tablet, Take 1 tablet by mouth At Night As Needed for Muscle Spasms., Disp: 30 tablet, Rfl: 1    Physical Exam:    Physical Exam  Vitals reviewed.   Constitutional:       Appearance: Normal appearance. She is well-developed.   HENT:      Head: Normocephalic and atraumatic.      Right Ear: Hearing, tympanic membrane, ear canal and external ear normal.      Left Ear: Hearing, tympanic membrane, ear canal and external ear normal.      Nose: Nose normal.      Mouth/Throat:      Mouth: Mucous membranes are moist.      Pharynx: Oropharynx is clear. Uvula midline.   Eyes:      General: Lids are normal.       Conjunctiva/sclera: Conjunctivae normal.      Pupils: Pupils are equal, round, and reactive to light.   Cardiovascular:      Rate and Rhythm: Normal rate and regular rhythm.      Heart sounds: Normal heart sounds.   Pulmonary:      Effort: Pulmonary effort is normal.      Breath sounds: Normal breath sounds.   Abdominal:      General: Bowel sounds are normal.      Palpations: Abdomen is soft.   Musculoskeletal:         General: Normal range of motion.      Cervical back: Full passive range of motion without pain, normal range of motion and neck supple.   Skin:     General: Skin is warm and dry.   Neurological:      Mental Status: She is alert and oriented to person, place, and time.      Deep Tendon Reflexes: Reflexes are normal and symmetric.   Psychiatric:         Speech: Speech normal.         Behavior: Behavior normal.         Thought Content: Thought content normal.         Judgment: Judgment normal.         Procedures        Assessment/Plan   Problem List Items Addressed This Visit        Endocrine and Metabolic    Vitamin D deficiency    Overview     Continue vitamin D 5000 units daily.         Relevant Medications    vitamin D (ERGOCALCIFEROL) 1.25 MG (80481 UT) capsule capsule       Gastrointestinal Abdominal     Colon cancer screening - Primary    Relevant Orders    Ambulatory Referral For Screening Colonoscopy (Completed)       Genitourinary and Reproductive     Irregular menses    Overview     FSH greater than 40 in April.  Last menstrual period May or June.  Patient certainly perimenopausal.  Encouraged her to continue regular cardiovascular exercise and low-fat diet.  She does have some hot flashes, but prefers no medication or supplements at the time.  We will continue to monitor.            Musculoskeletal and Injuries    Myalgia    Overview     Neck tightness.  Likely due to long work hours.  For no cyclobenzaprine 10 mg tablets.  Encourage continued yoga before bed.         Relevant  Medications    cyclobenzaprine (FLEXERIL) 10 MG tablet       Symptoms and Signs    Dizziness    Overview     Improving with decrease in lisinopril, increased fluid intake throughout the day.  For long work hours and stress may also be a factor.               Return in about 4 months (around 2/18/2022) for RECHECK 30MIN.    Plan of care reviewed with patient at the conclusion of today's visit. Education was provided regarding diagnosis, management, and any prescribed or recommended OTC medications.Patient verbalizes understanding of and agreement with management plan.     I spent 33 minutes caring for Tanner on this date of service. This time includes time spent by me in the following activities:preparing for the visit, reviewing tests, obtaining and/or reviewing a separately obtained history, performing a medically appropriate examination and/or evaluation , counseling and educating the patient/family/caregiver, ordering medications, tests, or procedures, referring and communicating with other health care professionals  and documenting information in the medical record    Aimee Levine PA-C    Please note that portions of this note were completed with a voice recognition program. Efforts were made to edit the dictations, but occasionally words are mistranscribed.

## 2021-11-17 ENCOUNTER — AMBULATORY SURGICAL CENTER (OUTPATIENT)
Dept: URBAN - METROPOLITAN AREA SURGERY 10 | Facility: SURGERY | Age: 51
End: 2021-11-17
Payer: COMMERCIAL

## 2021-11-17 ENCOUNTER — OFFICE (OUTPATIENT)
Dept: URBAN - METROPOLITAN AREA PATHOLOGY 4 | Facility: PATHOLOGY | Age: 51
End: 2021-11-17
Payer: COMMERCIAL

## 2021-11-17 DIAGNOSIS — K64.0 FIRST DEGREE HEMORRHOIDS: ICD-10-CM

## 2021-11-17 DIAGNOSIS — Z85.038 PERSONAL HISTORY OF OTHER MALIGNANT NEOPLASM OF LARGE INTEST: ICD-10-CM

## 2021-11-17 DIAGNOSIS — D12.7 BENIGN NEOPLASM OF RECTOSIGMOID JUNCTION: ICD-10-CM

## 2021-11-17 DIAGNOSIS — Z80.0 FAMILY HISTORY OF MALIGNANT NEOPLASM OF DIGESTIVE ORGANS: ICD-10-CM

## 2021-11-17 PROCEDURE — 88305 TISSUE EXAM BY PATHOLOGIST: CPT | Performed by: INTERNAL MEDICINE

## 2021-11-17 PROCEDURE — 45385 COLONOSCOPY W/LESION REMOVAL: CPT | Mod: 33 | Performed by: INTERNAL MEDICINE

## 2021-11-18 PROBLEM — Z85.038 PERSONAL HISTORY OF COLON CANCER: Status: ACTIVE | Noted: 2021-11-18

## 2021-12-15 ENCOUNTER — TELEPHONE (OUTPATIENT)
Dept: INTERNAL MEDICINE | Facility: CLINIC | Age: 51
End: 2021-12-15

## 2021-12-15 NOTE — TELEPHONE ENCOUNTER
Although we don't have clear evidence of which combination of vaccines is best, we do know that pfizer and moderna are both more effective than the J and J, so would suggest she get pfizer or moderna.

## 2021-12-16 DIAGNOSIS — I10 ESSENTIAL HYPERTENSION: ICD-10-CM

## 2021-12-16 RX ORDER — HYDROCHLOROTHIAZIDE 25 MG/1
TABLET ORAL
Qty: 60 TABLET | Refills: 0 | Status: SHIPPED | OUTPATIENT
Start: 2021-12-16 | End: 2022-03-03

## 2022-02-18 ENCOUNTER — OFFICE VISIT (OUTPATIENT)
Dept: INTERNAL MEDICINE | Facility: CLINIC | Age: 52
End: 2022-02-18

## 2022-02-18 VITALS
WEIGHT: 204 LBS | HEIGHT: 69 IN | HEART RATE: 80 BPM | SYSTOLIC BLOOD PRESSURE: 130 MMHG | OXYGEN SATURATION: 100 % | BODY MASS INDEX: 30.21 KG/M2 | TEMPERATURE: 98.4 F | DIASTOLIC BLOOD PRESSURE: 90 MMHG

## 2022-02-18 DIAGNOSIS — N95.1 MENOPAUSAL SYMPTOMS: ICD-10-CM

## 2022-02-18 DIAGNOSIS — R42 DIZZINESS: ICD-10-CM

## 2022-02-18 DIAGNOSIS — M79.89 LEG SWELLING: ICD-10-CM

## 2022-02-18 DIAGNOSIS — I10 ESSENTIAL HYPERTENSION: Primary | ICD-10-CM

## 2022-02-18 DIAGNOSIS — M79.10 MYALGIA: ICD-10-CM

## 2022-02-18 PROCEDURE — 99214 OFFICE O/P EST MOD 30 MIN: CPT | Performed by: PHYSICIAN ASSISTANT

## 2022-02-18 PROCEDURE — 93000 ELECTROCARDIOGRAM COMPLETE: CPT | Performed by: PHYSICIAN ASSISTANT

## 2022-02-18 RX ORDER — CYCLOBENZAPRINE HCL 5 MG
5 TABLET ORAL NIGHTLY PRN
Qty: 30 TABLET | Refills: 2 | Status: SHIPPED | OUTPATIENT
Start: 2022-02-18 | End: 2022-07-18

## 2022-02-18 NOTE — PROGRESS NOTES
"[facility] Internal Medicine    Tanner Mills  1970   [id number]      Patient Care Team:  Aimee Levine PA-C as PCP - General (Internal Medicine)    Chief Complaint::   Chief Complaint   Patient presents with   • Hypertension     4 monhtr follow up      Patient verbalized consent to the visit recording.      HPI  Tanner Mills is a 51-year-old female who presents for follow-up on hypertension, vitamin D deficiency, and dizziness.     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 When she increases her hours, she develops dizziness and \"fogginess.\" Denies chest pain, shortness of breath, or palpitations. The patient has not been able to routinely walk due to the colder weather.     Anxiety.   Occasionally, she will experience panic attacks, which she does not believe needs to be treated with medication at this moment.     Dizziness.   The patient states the dizziness is exacerbated when standing from a seated position. She however does not believe it is a medical issue, she attributes the symptoms to stress.     Patient Active Problem List   Diagnosis   • Rheumatoid arthritis (HCC)   • Essential hypertension   • Myalgia   • Nausea   • BMI 28.0-28.9,adult   • Vitamin D deficiency   • Routine gynecological examination   • PMS (premenstrual syndrome)   • Irregular menses   • Dizziness   • Colon cancer screening   • Menopausal symptoms        History reviewed. No pertinent past medical history.    History reviewed. No pertinent surgical history.    Family History   Problem Relation Age of Onset   • Breast cancer Mother 58   • Ovarian cancer Neg Hx        Social History     Socioeconomic History   • Marital status:    Tobacco Use   • Smoking status: Never Smoker   • Smokeless tobacco: Never Used   Substance and Sexual Activity   • Alcohol use: No   • Drug use: No   • Sexual activity: Defer       No Known " "Allergies    Review of Systems   Constitutional: Negative for activity change, appetite change, chills, diaphoresis, fatigue, fever, unexpected weight gain and unexpected weight loss.   HENT: Negative for congestion, dental problem, ear pain, hearing loss, postnasal drip, rhinorrhea, sinus pressure, sneezing, sore throat, swollen glands, trouble swallowing and voice change.    Eyes: Negative for pain, discharge, redness, itching and visual disturbance.   Respiratory: Negative for cough, chest tightness, shortness of breath and wheezing.    Cardiovascular: Positive for leg swelling. Negative for chest pain and palpitations.   Gastrointestinal: Negative for abdominal pain, anal bleeding, blood in stool, constipation, diarrhea, nausea, vomiting and GERD.   Endocrine: Negative for cold intolerance, heat intolerance, polydipsia and polyphagia.   Genitourinary: Negative for decreased urine volume, difficulty urinating, dysuria, frequency, hematuria, urgency and urinary incontinence.   Musculoskeletal: Negative for arthralgias, back pain, gait problem, myalgias and neck pain.   Skin: Negative for rash, skin lesions and wound.   Allergic/Immunologic: Negative for environmental allergies, food allergies and immunocompromised state.   Neurological: Positive for dizziness. Negative for tremors, light-headedness, numbness, headache and memory problem.   Hematological: Negative for adenopathy. Does not bruise/bleed easily.   Psychiatric/Behavioral: Positive for stress. Negative for decreased concentration, dysphoric mood, self-injury, sleep disturbance, suicidal ideas, negative for hyperactivity and depressed mood. The patient is not nervous/anxious.         Vital Signs  Vitals:    02/18/22 0929   BP: 130/90   BP Location: Left arm   Patient Position: Sitting   Cuff Size: Large Adult   Pulse: 80   Temp: 98.4 °F (36.9 °C)   TempSrc: Temporal   SpO2: 100%   Weight: 92.5 kg (204 lb)   Height: 175.3 cm (69.02\")   PainSc: 0-No pain "     Body mass index is 30.11 kg/m².  Patient's Body mass index is 30.11 kg/m². indicating that she is obese (BMI >30). Obesity-related health conditions include the following: hypertension. Obesity is worsening. BMI is is above average; BMI management plan is completed. We discussed low calorie, low carb based diet program, portion control and increasing exercise..         Current Outpatient Medications:   •  cyclobenzaprine (FLEXERIL) 5 MG tablet, Take 1 tablet by mouth At Night As Needed for Muscle Spasms., Disp: 30 tablet, Rfl: 2  •  hydroCHLOROthiazide (HYDRODIURIL) 25 MG tablet, Take 1 tablet by mouth once daily, Disp: 60 tablet, Rfl: 0  •  lisinopril (PRINIVIL,ZESTRIL) 5 MG tablet, Take 1 tablet by mouth Daily. Taking half tablet, Disp: 90 tablet, Rfl: 1  •  vitamin D (ERGOCALCIFEROL) 1.25 MG (76647 UT) capsule capsule, Take 1 capsule by mouth 1 (One) Time Per Week., Disp: 4 capsule, Rfl: 3    Physical Exam  Vitals and nursing note reviewed.   Constitutional:       General: She is not in acute distress.     Appearance: Normal appearance. She is well-developed.   HENT:      Head: Normocephalic and atraumatic.      Right Ear: Hearing, tympanic membrane and ear canal normal.      Left Ear: Hearing, tympanic membrane and ear canal normal.      Nose: Nose normal.      Mouth/Throat:      Mouth: Mucous membranes are moist.      Dentition: Normal dentition.      Pharynx: Oropharynx is clear.   Eyes:      Conjunctiva/sclera: Conjunctivae normal.      Pupils: Pupils are equal, round, and reactive to light.   Neck:      Thyroid: No thyroid mass or thyromegaly.      Vascular: No carotid bruit or JVD.   Cardiovascular:      Rate and Rhythm: Normal rate and regular rhythm.      Heart sounds: Normal heart sounds, S1 normal and S2 normal. No murmur heard.      Pulmonary:      Effort: Pulmonary effort is normal.      Breath sounds: Normal breath sounds.   Chest:   Breasts:      Right: No supraclavicular adenopathy.      Left:  No supraclavicular adenopathy.       Abdominal:      General: Bowel sounds are normal. There is no distension or abdominal bruit.      Palpations: Abdomen is soft. There is no mass.      Tenderness: There is no abdominal tenderness.   Musculoskeletal:         General: Normal range of motion.      Cervical back: Normal range of motion and neck supple.      Right lower leg: Edema present.      Left lower leg: Edema present.   Lymphadenopathy:      Cervical: No cervical adenopathy.      Upper Body:      Right upper body: No supraclavicular adenopathy.      Left upper body: No supraclavicular adenopathy.   Skin:     General: Skin is warm and dry.      Findings: Erythema present. No rash.      Nails: There is no clubbing.   Neurological:      Mental Status: She is alert.      Cranial Nerves: No cranial nerve deficit.      Sensory: No sensory deficit.      Gait: Gait normal.      Comments: CN II-XII grossly intact/symmetric.    Psychiatric:         Mood and Affect: Mood normal.         Behavior: Behavior normal.         Thought Content: Thought content normal.         Judgment: Judgment normal.          ACE III MINI            Results Review:    No results found for this or any previous visit (from the past 672 hour(s)).    ECG 12 Lead    Date/Time: 2/18/2022 11:21 AM  Performed by: Aimee Levine PA-C  Authorized by: Aimee Levine PA-C   Comparison: not compared with previous ECG   Rhythm: sinus rhythm  BPM: 72    Clinical impression: normal ECG  Comments: Sinus rhythm ventricular rate of 72 bpm            Medication Review: Medications reviewed and noted    Social History     Socioeconomic History   • Marital status:    Tobacco Use   • Smoking status: Never Smoker   • Smokeless tobacco: Never Used   Substance and Sexual Activity   • Alcohol use: No   • Drug use: No   • Sexual activity: Defer        Assessment/Plan:    Problem List Items Addressed This Visit        Cardiac and Vasculature     Essential hypertension - Primary    Overview     Well controlled with lisinopril 5mg and hydrochlorothiazide 25mg tablet.  Continue to drink plenty water.  Continue exercising daily.         Relevant Medications    lisinopril (PRINIVIL,ZESTRIL) 5 MG tablet    hydroCHLOROthiazide (HYDRODIURIL) 25 MG tablet    Other Relevant Orders    ECG 12 Lead       Genitourinary and Reproductive     Menopausal symptoms    Overview     Patient declines hormone therapy due to mother's history of hormone related breast CA.  LMP 7/2021.            Musculoskeletal and Injuries    Myalgia    Overview     Neck tightness.  Likely due to long work hours. Cyclobenzaprine 5 mg tablets.  Encourage continued yoga before bed.         Relevant Medications    cyclobenzaprine (FLEXERIL) 5 MG tablet       Symptoms and Signs    Dizziness    Overview     Improving with decrease in lisinopril, increased fluid intake throughout the day.  For long work hours and stress may also be a factor.         Relevant Orders    ECG 12 Lead           There are no Patient Instructions on file for this visit.     Plan of care reviewed with patient at the conclusion of today's visit. Education was provided regarding diagnosis, management, and any prescribed or recommended OTC medications.Patient verbalizes understanding of and agreement with management plan.       I spent 45 minutes caring for Tanner on this date of service. This time includes time spent by me in the following activities:preparing for the visit, reviewing tests, obtaining and/or reviewing a separately obtained history, performing a medically appropriate examination and/or evaluation , counseling and educating the patient/family/caregiver, ordering medications, tests, or procedures, referring and communicating with other health care professionals  and documenting information in the medical record     Aimee Levine PA-C    Note: Part of this note may be an electronic transcription/translation of  spoken language to printed text using the Dragon Dictation system.       Transcribed from ambient dictation for Aimee Levine PA-C by Nuvia Fairbanks.  02/18/22   13:48 EST

## 2022-03-03 DIAGNOSIS — I10 ESSENTIAL HYPERTENSION: ICD-10-CM

## 2022-03-03 RX ORDER — HYDROCHLOROTHIAZIDE 25 MG/1
TABLET ORAL
Qty: 180 TABLET | Refills: 1 | Status: SHIPPED | OUTPATIENT
Start: 2022-03-03

## 2022-07-18 ENCOUNTER — OFFICE VISIT (OUTPATIENT)
Dept: INTERNAL MEDICINE | Facility: CLINIC | Age: 52
End: 2022-07-18

## 2022-07-18 ENCOUNTER — LAB (OUTPATIENT)
Dept: LAB | Facility: HOSPITAL | Age: 52
End: 2022-07-18

## 2022-07-18 VITALS
BODY MASS INDEX: 29.47 KG/M2 | OXYGEN SATURATION: 100 % | WEIGHT: 199 LBS | HEIGHT: 69 IN | DIASTOLIC BLOOD PRESSURE: 80 MMHG | TEMPERATURE: 98.4 F | SYSTOLIC BLOOD PRESSURE: 120 MMHG | HEART RATE: 70 BPM

## 2022-07-18 DIAGNOSIS — R42 DIZZINESS: ICD-10-CM

## 2022-07-18 DIAGNOSIS — N92.6 IRREGULAR MENSES: ICD-10-CM

## 2022-07-18 DIAGNOSIS — N95.1 MENOPAUSAL SYMPTOMS: ICD-10-CM

## 2022-07-18 DIAGNOSIS — R73.09 ABNORMAL GLUCOSE: ICD-10-CM

## 2022-07-18 DIAGNOSIS — I10 ESSENTIAL HYPERTENSION: ICD-10-CM

## 2022-07-18 DIAGNOSIS — E55.9 VITAMIN D DEFICIENCY: ICD-10-CM

## 2022-07-18 DIAGNOSIS — Z12.31 ENCOUNTER FOR SCREENING MAMMOGRAM FOR MALIGNANT NEOPLASM OF BREAST: ICD-10-CM

## 2022-07-18 DIAGNOSIS — M05.9 RHEUMATOID ARTHRITIS WITH POSITIVE RHEUMATOID FACTOR, INVOLVING UNSPECIFIED SITE: ICD-10-CM

## 2022-07-18 DIAGNOSIS — Z13.220 LIPID SCREENING: ICD-10-CM

## 2022-07-18 DIAGNOSIS — Z00.00 ROUTINE PHYSICAL EXAMINATION: Primary | ICD-10-CM

## 2022-07-18 DIAGNOSIS — M79.89 LEG SWELLING: ICD-10-CM

## 2022-07-18 LAB
25(OH)D3 SERPL-MCNC: 29.6 NG/ML (ref 30–100)
ALBUMIN SERPL-MCNC: 4.6 G/DL (ref 3.5–5.2)
ALBUMIN UR-MCNC: <1.2 MG/DL
ALBUMIN/GLOB SERPL: 1.6 G/DL
ALP SERPL-CCNC: 107 U/L (ref 39–117)
ALT SERPL W P-5'-P-CCNC: 20 U/L (ref 1–33)
ANION GAP SERPL CALCULATED.3IONS-SCNC: 8.1 MMOL/L (ref 5–15)
AST SERPL-CCNC: 19 U/L (ref 1–32)
BASOPHILS # BLD AUTO: 0.05 10*3/MM3 (ref 0–0.2)
BASOPHILS NFR BLD AUTO: 0.8 % (ref 0–1.5)
BILIRUB SERPL-MCNC: 0.3 MG/DL (ref 0–1.2)
BUN SERPL-MCNC: 10 MG/DL (ref 6–20)
BUN/CREAT SERPL: 13.9 (ref 7–25)
CALCIUM SPEC-SCNC: 9.4 MG/DL (ref 8.6–10.5)
CHLORIDE SERPL-SCNC: 101 MMOL/L (ref 98–107)
CHOLEST SERPL-MCNC: 242 MG/DL (ref 0–200)
CO2 SERPL-SCNC: 27.9 MMOL/L (ref 22–29)
CREAT SERPL-MCNC: 0.72 MG/DL (ref 0.57–1)
DEPRECATED RDW RBC AUTO: 39.6 FL (ref 37–54)
EGFRCR SERPLBLD CKD-EPI 2021: 101.4 ML/MIN/1.73
EOSINOPHIL # BLD AUTO: 0.17 10*3/MM3 (ref 0–0.4)
EOSINOPHIL NFR BLD AUTO: 2.8 % (ref 0.3–6.2)
ERYTHROCYTE [DISTWIDTH] IN BLOOD BY AUTOMATED COUNT: 12.1 % (ref 12.3–15.4)
ERYTHROCYTE [SEDIMENTATION RATE] IN BLOOD: 23 MM/HR (ref 0–30)
GLOBULIN UR ELPH-MCNC: 2.9 GM/DL
GLUCOSE SERPL-MCNC: 87 MG/DL (ref 65–99)
HBA1C MFR BLD: 5.6 % (ref 4.8–5.6)
HCT VFR BLD AUTO: 42.1 % (ref 34–46.6)
HDLC SERPL-MCNC: 72 MG/DL (ref 40–60)
HGB BLD-MCNC: 14.3 G/DL (ref 12–15.9)
IMM GRANULOCYTES # BLD AUTO: 0.02 10*3/MM3 (ref 0–0.05)
IMM GRANULOCYTES NFR BLD AUTO: 0.3 % (ref 0–0.5)
LDLC SERPL CALC-MCNC: 163 MG/DL (ref 0–100)
LDLC/HDLC SERPL: 2.23 {RATIO}
LYMPHOCYTES # BLD AUTO: 1.88 10*3/MM3 (ref 0.7–3.1)
LYMPHOCYTES NFR BLD AUTO: 31.2 % (ref 19.6–45.3)
MCH RBC QN AUTO: 30.4 PG (ref 26.6–33)
MCHC RBC AUTO-ENTMCNC: 34 G/DL (ref 31.5–35.7)
MCV RBC AUTO: 89.6 FL (ref 79–97)
MONOCYTES # BLD AUTO: 0.45 10*3/MM3 (ref 0.1–0.9)
MONOCYTES NFR BLD AUTO: 7.5 % (ref 5–12)
NEUTROPHILS NFR BLD AUTO: 3.45 10*3/MM3 (ref 1.7–7)
NEUTROPHILS NFR BLD AUTO: 57.4 % (ref 42.7–76)
NRBC BLD AUTO-RTO: 0 /100 WBC (ref 0–0.2)
PLATELET # BLD AUTO: 267 10*3/MM3 (ref 140–450)
PMV BLD AUTO: 12.3 FL (ref 6–12)
POTASSIUM SERPL-SCNC: 4.4 MMOL/L (ref 3.5–5.2)
PROT SERPL-MCNC: 7.5 G/DL (ref 6–8.5)
RBC # BLD AUTO: 4.7 10*6/MM3 (ref 3.77–5.28)
SODIUM SERPL-SCNC: 137 MMOL/L (ref 136–145)
T3FREE SERPL-MCNC: 2.87 PG/ML (ref 2–4.4)
T4 FREE SERPL-MCNC: 1.11 NG/DL (ref 0.93–1.7)
TRIGL SERPL-MCNC: 46 MG/DL (ref 0–150)
TSH SERPL DL<=0.05 MIU/L-ACNC: 1.12 UIU/ML (ref 0.27–4.2)
VLDLC SERPL-MCNC: 7 MG/DL (ref 5–40)
WBC NRBC COR # BLD: 6.02 10*3/MM3 (ref 3.4–10.8)

## 2022-07-18 PROCEDURE — 99213 OFFICE O/P EST LOW 20 MIN: CPT | Performed by: PHYSICIAN ASSISTANT

## 2022-07-18 PROCEDURE — 80061 LIPID PANEL: CPT

## 2022-07-18 PROCEDURE — 82306 VITAMIN D 25 HYDROXY: CPT

## 2022-07-18 PROCEDURE — 83036 HEMOGLOBIN GLYCOSYLATED A1C: CPT

## 2022-07-18 PROCEDURE — 82043 UR ALBUMIN QUANTITATIVE: CPT

## 2022-07-18 PROCEDURE — 80050 GENERAL HEALTH PANEL: CPT

## 2022-07-18 PROCEDURE — 84439 ASSAY OF FREE THYROXINE: CPT

## 2022-07-18 PROCEDURE — 99396 PREV VISIT EST AGE 40-64: CPT | Performed by: PHYSICIAN ASSISTANT

## 2022-07-18 PROCEDURE — 85652 RBC SED RATE AUTOMATED: CPT

## 2022-07-18 PROCEDURE — 84481 FREE ASSAY (FT-3): CPT

## 2022-07-18 NOTE — PROGRESS NOTES
[facility] Internal Medicine    Tanner Mills  1970   [id number]      Patient Care Team:  Aimee Levine PA-C as PCP - General (Internal Medicine)    Chief Complaint::   Chief Complaint   Patient presents with   • Annual Exam        HPI    Tanner Mills is a 52-year-old female who presents today for routine annual exam. Her past medical history is significant for hypertension and vitamin D deficiency.    Health maintenance  The patient states she is doing well. Her last Pap smear was performed in 2020. Her last mammogram was performed on 12/29/2020. She has not received a Shingrix vaccine. She has not had a bone density scan. She explains her last menstrual cycle occurred on 05/17/2022 but her previous one occurred on 07/04/2021. She denies any pelvic pain, pressure, or fullness. She admits frequent urination. She admits a burning sensation while urinating if she does not drink enough water. She denies any pain during intercourse. Her last colonoscopy was performed on 11/17/2021. She admits experiencing symptoms of menopause. She admits dizziness. She admits intermittent bilateral leg edema. She explains she recently lost 7 pounds but gained 5 pounds back. She denies any chest pain, shortness of breath, palpitations, abdominal pain, or changes in bowel movements. She denies any breast pain. She has not tried Estroven. She is not taking estrogen. She is not sure who performed her last eye exam. She denies any sinus pressure. She thinks she is still experiencing nystagmus. She is unsure if she is experiencing eye strain at the end of the day. She admits performing breast exams. She denies any joint pain currently. She explains she was diagnosed with rheumatoid arthritis in 2001. Her blood pressure is improved. She explains she has been exercising by going for walks. She has been drinking more water. She denies any family history of ovarian, endometrial, or uterine cancer. She is not taking cyclobenzaprine.        Patient Active Problem List   Diagnosis   • Rheumatoid arthritis (HCC)   • Essential hypertension   • Myalgia   • Nausea   • BMI 28.0-28.9,adult   • Vitamin D deficiency   • Routine gynecological examination   • PMS (premenstrual syndrome)   • Irregular menses   • Dizziness   • Colon cancer screening   • Menopausal symptoms   • Leg swelling   • Routine physical examination   • Encounter for screening mammogram for malignant neoplasm of breast   • Lipid screening        History reviewed. No pertinent past medical history.    History reviewed. No pertinent surgical history.    Family History   Problem Relation Age of Onset   • Breast cancer Mother 58   • Ovarian cancer Neg Hx        Social History     Socioeconomic History   • Marital status:    Tobacco Use   • Smoking status: Never Smoker   • Smokeless tobacco: Never Used   Substance and Sexual Activity   • Alcohol use: No   • Drug use: No   • Sexual activity: Defer       No Known Allergies    Review of Systems   Constitutional: Negative for activity change, appetite change, diaphoresis, fatigue, unexpected weight gain and unexpected weight loss.   HENT: Negative for hearing loss.    Eyes: Negative for blurred vision, double vision and visual disturbance.   Respiratory: Negative for chest tightness and shortness of breath.    Cardiovascular: Negative for chest pain, palpitations and leg swelling.   Gastrointestinal: Negative for abdominal pain, blood in stool, GERD and indigestion.   Endocrine: Negative for cold intolerance and heat intolerance.   Genitourinary: Positive for menstrual problem. Negative for breast discharge, breast lump, breast pain, decreased urine volume, dyspareunia, dysuria, hematuria, pelvic pain, pelvic pressure, urgency, urinary incontinence, vaginal discharge and vaginal pain.   Musculoskeletal: Negative for arthralgias and myalgias.   Skin: Negative for skin lesions.   Neurological: Negative for tremors, seizures, syncope, speech  "difficulty, weakness, headache, memory problem and confusion.   Hematological: Does not bruise/bleed easily.   Psychiatric/Behavioral: Negative for sleep disturbance and depressed mood. The patient is not nervous/anxious.         Vital Signs  Vitals:    07/18/22 0914   BP: 120/80   BP Location: Left arm   Patient Position: Sitting   Cuff Size: Adult   Pulse: 70   Temp: 98.4 °F (36.9 °C)   TempSrc: Temporal   SpO2: 100%   Weight: 90.3 kg (199 lb)   Height: 175.3 cm (69.02\")   PainSc: 0-No pain     Body mass index is 29.37 kg/m².  BMI is >= 30 and <35. (Class 1 Obesity). The following options were offered after discussion;: weight loss educational material (shared in after visit summary), exercise counseling/recommendations and nutrition counseling/recommendations     Advance Care Planning   ACP discussion was held with the patient during this visit. Patient does not have an advance directive, information provided.       Current Outpatient Medications:   •  hydroCHLOROthiazide (HYDRODIURIL) 25 MG tablet, Take 1 tablet by mouth once daily, Disp: 180 tablet, Rfl: 1  •  lisinopril (PRINIVIL,ZESTRIL) 5 MG tablet, Take 1 tablet by mouth Daily. Taking half tablet, Disp: 90 tablet, Rfl: 1  •  vitamin D (ERGOCALCIFEROL) 1.25 MG (07694 UT) capsule capsule, Take 1 capsule by mouth 1 (One) Time Per Week., Disp: 4 capsule, Rfl: 3    Physical Exam  Vitals reviewed.   Constitutional:       Appearance: Normal appearance. She is well-developed.   HENT:      Head: Normocephalic and atraumatic.      Right Ear: Hearing, tympanic membrane, ear canal and external ear normal.      Left Ear: Hearing, tympanic membrane, ear canal and external ear normal.      Nose: Nose normal.      Mouth/Throat:      Mouth: Mucous membranes are moist.      Pharynx: Oropharynx is clear. Uvula midline. No posterior oropharyngeal erythema.   Eyes:      General: Lids are normal.      Conjunctiva/sclera: Conjunctivae normal.      Pupils: Pupils are equal, round, " and reactive to light.   Cardiovascular:      Rate and Rhythm: Normal rate and regular rhythm.      Heart sounds: Normal heart sounds.   Pulmonary:      Effort: Pulmonary effort is normal.      Breath sounds: Normal breath sounds.   Abdominal:      General: Abdomen is flat. Bowel sounds are normal.      Palpations: Abdomen is soft.      Tenderness: There is no right CVA tenderness or left CVA tenderness.   Musculoskeletal:         General: Normal range of motion.      Cervical back: Full passive range of motion without pain, normal range of motion and neck supple.   Skin:     General: Skin is warm and dry.   Neurological:      General: No focal deficit present.      Mental Status: She is alert and oriented to person, place, and time. Mental status is at baseline.      Deep Tendon Reflexes: Reflexes are normal and symmetric.   Psychiatric:         Mood and Affect: Mood normal.         Speech: Speech normal.         Behavior: Behavior normal.         Thought Content: Thought content normal.         Judgment: Judgment normal.          ACE III MINI            Results Review:    No results found for this or any previous visit (from the past 672 hour(s)).  Procedures    Medication Review: Medications reviewed and noted    Social History     Socioeconomic History   • Marital status:    Tobacco Use   • Smoking status: Never Smoker   • Smokeless tobacco: Never Used   Substance and Sexual Activity   • Alcohol use: No   • Drug use: No   • Sexual activity: Defer        Assessment/Plan:    Problem List Items Addressed This Visit        Cardiac and Vasculature    Essential hypertension    Overview     Well controlled with lisinopril 5mg and hydrochlorothiazide 25mg tablet.  Continue to drink plenty water.  Continue exercising daily.           Relevant Medications    lisinopril (PRINIVIL,ZESTRIL) 5 MG tablet    hydroCHLOROthiazide (HYDRODIURIL) 25 MG tablet    Other Relevant Orders    CBC & Differential    Comprehensive  Metabolic Panel    MicroAlbumin, Urine, Random - Urine, Clean Catch    Lipid screening    Relevant Orders    Lipid Panel       Endocrine and Metabolic    Vitamin D deficiency    Overview     Continue vitamin D 5000 units daily.  Recheck levels today.           Relevant Medications    vitamin D (ERGOCALCIFEROL) 1.25 MG (56542 UT) capsule capsule    Other Relevant Orders    Vitamin D 25 Hydroxy       Genitourinary and Reproductive     Irregular menses    Overview     Previous last menstrual period July 4, 2021.  Patient had another cycle May 17, 2022.  She denies pelvic pain, fullness, or dyspareunia.  We will continue to monitor.  May suggest TVS if she experiences additional symptoms.           Menopausal symptoms    Overview     Patient declines hormone therapy due to mother's history of hormone related breast CA.  LMP 5/2022    I advised the patient to eat more soy-based foods.              Relevant Orders    TSH    T4, Free    T3, Free    Encounter for screening mammogram for malignant neoplasm of breast    Relevant Orders    Mammo Screening Digital Tomosynthesis Bilateral With CAD       Health Encounters    Routine physical examination - Primary    Overview     She is current on COVID vaccinations.  Discussed Shingrix.  She will consider.  Order for mammogram sent.  She is current on colonoscopy, as last one was performed on 11/17/2001.  Goal is to focus on regular cardiovascular exercise to keep weight below 200lbs.              Musculoskeletal and Injuries    Rheumatoid arthritis (HCC)    Overview     Reports decrease in joint pain and fatigue.  She has been exercising regularly.  We will check sed rate for maintenance.           Relevant Orders    Sedimentation Rate       Symptoms and Signs    Dizziness    Overview     Improving with decrease in lisinopril, increased fluid intake throughout the day.  For long work hours and stress may also be a factor.           Leg swelling    Overview     Likely a result  from sedentary job.  She needs to elevate her legs throughout the day.  Get up and walk frequently throughout the day.  Has noticed improvement since she has increased her activity.             Other Visit Diagnoses     Abnormal glucose        Relevant Orders    Hemoglobin A1c           Patient Instructions   BMI for Adults  What is BMI?  Body mass index (BMI) is a number that is calculated from a person's weight and height. BMI can help estimate how much of a person's weight is composed of fat. BMI does not measure body fat directly. Rather, it is an alternative to procedures that directly measure body fat, which can be difficult and expensive.  BMI can help identify people who may be at higher risk for certain medical problems.  What are BMI measurements used for?  BMI is used as a screening tool to identify possible weight problems. It helps determine whether a person is obese, overweight, a healthy weight, or underweight.  BMI is useful for:  · Identifying a weight problem that may be related to a medical condition or may increase the risk for medical problems.  · Promoting changes, such as changes in diet and exercise, to help reach a healthy weight. BMI screening can be repeated to see if these changes are working.  How is BMI calculated?  BMI involves measuring your weight in relation to your height. Both height and weight are measured, and the BMI is calculated from those numbers. This can be done either in English ([place] or metric measurements. Note that charts and online BMI calculators are available to help you find your BMI quickly and easily without having to do these calculations yourself.  To calculate your BMI in English ([place] measurements:    1. Measure your weight in pounds (lb).  2. Multiply the number of pounds by 703.  ? For example, for a person who weighs 180 lb, multiply that number by 703, which equals 126,540.  3. Measure your height in inches. Then multiply that number by itself to get a  "measurement called \"inches squared.\"  ? For example, for a person who is 70 inches tall, the \"inches squared\" measurement is 70 inches x 70 inches, which equals 4,900 inches squared.  4. Divide the total from step 2 (number of lb x 703) by the total from step 3 (inches squared): 126,540 ÷ 4,900 = 25.8. This is your BMI.    To calculate your BMI in metric measurements:  1. Measure your weight in kilograms (kg).  2. Measure your height in meters (m). Then multiply that number by itself to get a measurement called \"meters squared.\"  ? For example, for a person who is 1.75 m tall, the \"meters squared\" measurement is 1.75 m x 1.75 m, which is equal to 3.1 meters squared.  3. Divide the number of kilograms (your weight) by the meters squared number. In this example: 70 ÷ 3.1 = 22.6. This is your BMI.  What do the results mean?  BMI charts are used to identify whether you are underweight, normal weight, overweight, or obese. The following guidelines will be used:  · Underweight: BMI less than 18.5.  · Normal weight: BMI between 18.5 and 24.9.  · Overweight: BMI between 25 and 29.9.  · Obese: BMI of 30 or above.  Keep these notes in mind:  · Weight includes both fat and muscle, so someone with a muscular build, such as an athlete, may have a BMI that is higher than 24.9. In cases like these, BMI is not an accurate measure of body fat.  · To determine if excess body fat is the cause of a BMI of 25 or higher, further assessments may need to be done by a health care provider.  · BMI is usually interpreted in the same way for men and women.  Where to find more information  For more information about BMI, including tools to quickly calculate your BMI, go to these websites:  · Centers for Disease Control and Prevention: www.cdc.gov  · American Heart Association: www.heart.org  · National Heart, Lung, and Blood [place]: www.nhlbi.nih.gov  Summary  · Body mass index (BMI) is a number that is calculated from a person's weight and " height.  · BMI may help estimate how much of a person's weight is composed of fat. BMI can help identify those who may be at higher risk for certain medical problems.  · BMI can be measured using English measurements or metric measurements.  · BMI charts are used to identify whether you are underweight, normal weight, overweight, or obese.  This information is not intended to replace advice given to you by your health care provider. Make sure you discuss any questions you have with your health care provider.  Document Revised: 09/09/2020 Document Reviewed: 07/17/2020  [employer] Patient Education © 2021 [employer]  Managing Your Hypertension  Hypertension, also called high blood pressure, is when the force of the blood pressing against the walls of the arteries is too strong. Arteries are blood vessels that carry blood from your heart throughout your body. Hypertension forces the heart to work harder to pump blood and may cause the arteries to become narrow or stiff.  Understanding blood pressure readings  Your personal target blood pressure may vary depending on your medical conditions, your age, and other factors. A blood pressure reading includes a higher number over a lower number. Ideally, your blood pressure should be below 120/80. You should know that:  · The first, or top, number is called the systolic pressure. It is a measure of the pressure in your arteries as your heart beats.  · The second, or bottom number, is called the diastolic pressure. It is a measure of the pressure in your arteries as the heart relaxes.  Blood pressure is classified into four stages. Based on your blood pressure reading, your health care provider may use the following stages to determine what type of treatment you need, if any. Systolic pressure and diastolic pressure are measured in a unit called mmHg.  Normal  · Systolic pressure: below 120.  · Diastolic pressure: below 80.  Elevated  · Systolic pressure:  120-129.  · Diastolic pressure: below 80.  Hypertension stage 1  · Systolic pressure: 130-139.  · Diastolic pressure: 80-89.  Hypertension stage 2  · Systolic pressure: 140 or above.  · Diastolic pressure: 90 or above.  How can this condition affect me?  Managing your hypertension is an important responsibility. Over time, hypertension can damage the arteries and decrease blood flow to important parts of the body, including the brain, heart, and kidneys. Having untreated or uncontrolled hypertension can lead to:  · A heart attack.  · A stroke.  · A weakened blood vessel (aneurysm).  · Heart failure.  · Kidney damage.  · Eye damage.  · Metabolic syndrome.  · Memory and concentration problems.  · Vascular dementia.  What actions can I take to manage this condition?  Hypertension can be managed by making lifestyle changes and possibly by taking medicines. Your health care provider will help you make a plan to bring your blood pressure within a normal range.  Nutrition    1. Eat a diet that is high in fiber and potassium, and low in salt (sodium), added sugar, and fat. An example eating plan is called the Dietary Approaches to Stop Hypertension (DASH) diet. To eat this way:  ? Eat plenty of fresh fruits and vegetables. Try to fill one-half of your plate at each meal with fruits and vegetables.  ? Eat whole grains, such as whole-wheat pasta, brown rice, or whole-grain bread. Fill about one-fourth of your plate with whole grains.  ? Eat low-fat dairy products.  ? Avoid fatty cuts of meat, processed or cured meats, and poultry with skin. Fill about one-fourth of your plate with lean proteins such as fish, chicken without skin, beans, eggs, and tofu.  ? Avoid pre-made and processed foods. These tend to be higher in sodium, added sugar, and fat.  2. Reduce your daily sodium intake. Most people with hypertension should eat less than 1,500 mg of sodium a day.    Lifestyle    · Work with your health care provider to maintain a  healthy body weight or to lose weight. Ask what an ideal weight is for you.  · Get at least 30 minutes of exercise that causes your heart to beat faster (aerobic exercise) most days of the week. Activities may include walking, swimming, or biking.  · Include exercise to strengthen your muscles (resistance exercise), such as weight lifting, as part of your weekly exercise routine. Try to do these types of exercises for 30 minutes at least 3 days a week.  · Do not use any products that contain nicotine or tobacco, such as cigarettes, e-cigarettes, and chewing tobacco. If you need help quitting, ask your health care provider.  · Control any long-term (chronic) conditions you have, such as high cholesterol or diabetes.  · Identify your sources of stress and find ways to manage stress. This may include meditation, deep breathing, or making time for fun activities.    Alcohol use  1. Do not drink alcohol if:  1. Your health care provider tells you not to drink.  2. You are pregnant, may be pregnant, or are planning to become pregnant.  2. If you drink alcohol:  1. Limit how much you use to:  § 0-1 drink a day for women.  § 0-2 drinks a day for men.  2. Be aware of how much alcohol is in your drink. In the [place] one drink equals one 12 oz bottle of beer (355 mL), one 5 oz glass of wine (148 mL), or one 1½ oz glass of hard liquor (44 mL).  Medicines  Your health care provider may prescribe medicine if lifestyle changes are not enough to get your blood pressure under control and if:  · Your systolic blood pressure is 130 or higher.  · Your diastolic blood pressure is 80 or higher.  Take medicines only as told by your health care provider. Follow the directions carefully. Blood pressure medicines must be taken as told by your health care provider. The medicine does not work as well when you skip doses. Skipping doses also puts you at risk for problems.  Monitoring  Before you monitor your blood pressure:  · Do not smoke,  drink caffeinated beverages, or exercise within 30 minutes before taking a measurement.  · Use the bathroom and empty your bladder (urinate).  · Sit quietly for at least 5 minutes before taking measurements.  Monitor your blood pressure at home as told by your health care provider. To do this:  · Sit with your back straight and supported.  · Place your feet flat on the floor. Do not cross your legs.  · Support your arm on a flat surface, such as a table. Make sure your upper arm is at heart level.  · Each time you measure, take two or three readings one minute apart and record the results.  You may also need to have your blood pressure checked regularly by your health care provider.  General information  · Talk with your health care provider about your diet, exercise habits, and other lifestyle factors that may be contributing to hypertension.  · Review all the medicines you take with your health care provider because there may be side effects or interactions.  · Keep all visits as told by your health care provider. Your health care provider can help you create and adjust your plan for managing your high blood pressure.  Where to find more information  · National Heart, Lung, and Blood [place]: www.nhlbi.nih.gov  · American Heart Association: www.heart.org  Contact a health care provider if:  · You think you are having a reaction to medicines you have taken.  · You have repeated (recurrent) headaches.  · You feel dizzy.  · You have swelling in your ankles.  · You have trouble with your vision.  Get help right away if:  · You develop a severe headache or confusion.  · You have unusual weakness or numbness, or you feel faint.  · You have severe pain in your chest or abdomen.  · You vomit repeatedly.  · You have trouble breathing.  These symptoms may represent a serious problem that is an emergency. Do not wait to see if the symptoms will go away. Get medical help right away. Call your local emergency services (522  "in the [place] Do not drive yourself to the hospital.  Summary  · Hypertension is when the force of blood pumping through your arteries is too strong. If this condition is not controlled, it may put you at risk for serious complications.  · Your personal target blood pressure may vary depending on your medical conditions, your age, and other factors. For most people, a normal blood pressure is less than 120/80.  · Hypertension is managed by lifestyle changes, medicines, or both.  · Lifestyle changes to help manage hypertension include losing weight, eating a healthy, low-sodium diet, exercising more, stopping smoking, and limiting alcohol.  This information is not intended to replace advice given to you by your health care provider. Make sure you discuss any questions you have with your health care provider.  Document Revised: 01/22/2021 Document Reviewed: 11/17/2020  [employer] Patient Education © 2021 [employer]  Critical care medicine: Principles of diagnosis and management in the adult (4th ed., pp. 8774-8718). [place].\"> Crowder's anesthesia (8th ed., pp. 232-250). Thompson.\">   Advance Directive    Advance directives are legal documents that allow you to make decisions about your health care and medical treatment in case you become unable to communicate for yourself. Advance directives let your wishes be known to family, friends, and health care providers.  Discussing and writing advance directives should happen over time rather than all at once. Advance directives can be changed and updated at any time. There are different types of advance directives, such as:  · Medical power of .  · Living will.  · Do not resuscitate (DNR) order or do not attempt resuscitation (DNAR) order.  Health care proxy and medical power of   A health care proxy is also called a health care agent. This person is appointed to make medical decisions for you when you are unable to make decisions for yourself. Generally, " people ask a trusted friend or family member to act as their proxy and represent their preferences. Make sure you have an agreement with your trusted person to act as your proxy. A proxy may have to make a medical decision on your behalf if your wishes are not known.  A medical power of , also called a durable power of  for health care, is a legal document that names your health care proxy. Depending on the laws in your state, the document may need to be:  · Signed.  · Notarized.  · Dated.  · Copied.  · Witnessed.  · Incorporated into your medical record.  You may also want to appoint a trusted person to manage your money in the event you are unable to do so. This is called a durable power of  for finances. It is a separate legal document from the durable power of  for health care. You may choose your health care proxy or someone different to act as your agent in money matters.  If you do not appoint a proxy, or there is a concern that the proxy is not acting in your best interest, a court may appoint a guardian to act on your behalf.  Living will  A living will is a set of instructions that state your wishes about medical care when you cannot express them yourself. Health care providers should keep a copy of your living will in your medical record. You may want to give a copy to family members or friends. To alert caregivers in case of an emergency, you can place a card in your wallet to let them know that you have a living will and where they can find it. A living will is used if you become:  · Terminally ill.  · Disabled.  · Unable to communicate or make decisions.  The following decisions should be included in your living will:  · To use or not to use life support equipment, such as dialysis machines and breathing machines (ventilators).  · Whether you want a DNR or DNAR order. This tells health care providers not to use cardiopulmonary resuscitation (CPR) if breathing or  heartbeat stops.  · To use or not to use tube feeding.  · To be given or not to be given food and fluids.  · Whether you want comfort (palliative) care when the goal becomes comfort rather than a cure.  · Whether you want to donate your organs and tissues.  A living will does not give instructions for distributing your money and property if you should pass away.  DNR or DNAR  A DNR or DNAR order is a request not to have CPR in the event that your heart stops beating or you stop breathing. If a DNR or DNAR order has not been made and shared, a health care provider will try to help any patient whose heart has stopped or who has stopped breathing. If you plan to have surgery, talk with your health care provider about how your DNR or DNAR order will be followed if problems occur.  What if I do not have an advance directive?  Some states assign family decision makers to act on your behalf if you do not have an advance directive. Each state has its own laws about advance directives. You may want to check with your health care provider, , or state representative about the laws in your state.  Summary  · Advance directives are legal documents that allow you to make decisions about your health care and medical treatment in case you become unable to communicate for yourself.  · The process of discussing and writing advance directives should happen over time. You can change and update advance directives at any time.  · Advance directives may include a medical power of , a living will, and a DNR or DNAR order.  This information is not intended to replace advice given to you by your health care provider. Make sure you discuss any questions you have with your health care provider.  Document Revised: 09/21/2021 Document Reviewed: 09/21/2021  Elsevier Patient Education © 2021 [employer]         Plan of care reviewed with patient at the conclusion of today's visit. Education was provided regarding diagnosis,  management, and any prescribed or recommended OTC medications.Patient verbalizes understanding of and agreement with management plan.         I spent 26 minutes caring for Tanner on this date of service. This time includes time spent by me in the following activities:preparing for the visit, reviewing tests, obtaining and/or reviewing a separately obtained history, performing a medically appropriate examination and/or evaluation , counseling and educating the patient/family/caregiver, ordering medications, tests, or procedures, referring and communicating with other health care professionals  and documenting information in the medical record    Aimee Levine PA-C      Note: Part of this note may be an electronic transcription/translation of spoken language to printed text using the Dragon Dictation system.    Transcribed from ambient dictation for Aimee Levine PA-C by RACHEL HILL.  07/18/22   10:45 EDT    Patient verbalized consent to the visit recording.  I have personally performed the services described in this document as transcribed by the above individual, and it is both accurate and complete.  Aimee Levine PA-C  7/19/2022  19:24 EDT

## 2022-07-18 NOTE — PATIENT INSTRUCTIONS
"BMI for Adults  What is BMI?  Body mass index (BMI) is a number that is calculated from a person's weight and height. BMI can help estimate how much of a person's weight is composed of fat. BMI does not measure body fat directly. Rather, it is an alternative to procedures that directly measure body fat, which can be difficult and expensive.  BMI can help identify people who may be at higher risk for certain medical problems.  What are BMI measurements used for?  BMI is used as a screening tool to identify possible weight problems. It helps determine whether a person is obese, overweight, a healthy weight, or underweight.  BMI is useful for:  Identifying a weight problem that may be related to a medical condition or may increase the risk for medical problems.  Promoting changes, such as changes in diet and exercise, to help reach a healthy weight. BMI screening can be repeated to see if these changes are working.  How is BMI calculated?  BMI involves measuring your weight in relation to your height. Both height and weight are measured, and the BMI is calculated from those numbers. This can be done either in English ([place] or metric measurements. Note that charts and online BMI calculators are available to help you find your BMI quickly and easily without having to do these calculations yourself.  To calculate your BMI in English ([place] measurements:    Measure your weight in pounds (lb).  Multiply the number of pounds by 703.  For example, for a person who weighs 180 lb, multiply that number by 703, which equals 126,540.  Measure your height in inches. Then multiply that number by itself to get a measurement called \"inches squared.\"  For example, for a person who is 70 inches tall, the \"inches squared\" measurement is 70 inches x 70 inches, which equals 4,900 inches squared.  Divide the total from step 2 (number of lb x 703) by the total from step 3 (inches squared): 126,540 ÷ 4,900 = 25.8. This is your BMI.    To " "calculate your BMI in metric measurements:  Measure your weight in kilograms (kg).  Measure your height in meters (m). Then multiply that number by itself to get a measurement called \"meters squared.\"  For example, for a person who is 1.75 m tall, the \"meters squared\" measurement is 1.75 m x 1.75 m, which is equal to 3.1 meters squared.  Divide the number of kilograms (your weight) by the meters squared number. In this example: 70 ÷ 3.1 = 22.6. This is your BMI.  What do the results mean?  BMI charts are used to identify whether you are underweight, normal weight, overweight, or obese. The following guidelines will be used:  Underweight: BMI less than 18.5.  Normal weight: BMI between 18.5 and 24.9.  Overweight: BMI between 25 and 29.9.  Obese: BMI of 30 or above.  Keep these notes in mind:  Weight includes both fat and muscle, so someone with a muscular build, such as an athlete, may have a BMI that is higher than 24.9. In cases like these, BMI is not an accurate measure of body fat.  To determine if excess body fat is the cause of a BMI of 25 or higher, further assessments may need to be done by a health care provider.  BMI is usually interpreted in the same way for men and women.  Where to find more information  For more information about BMI, including tools to quickly calculate your BMI, go to these websites:  Centers for Disease Control and Prevention: www.cdc.gov  American Heart Association: www.heart.org  National Heart, Lung, and Blood [place]: www.nhlbi.nih.gov  Summary  Body mass index (BMI) is a number that is calculated from a person's weight and height.  BMI may help estimate how much of a person's weight is composed of fat. BMI can help identify those who may be at higher risk for certain medical problems.  BMI can be measured using English measurements or metric measurements.  BMI charts are used to identify whether you are underweight, normal weight, overweight, or obese.  This information is not " intended to replace advice given to you by your health care provider. Make sure you discuss any questions you have with your health care provider.  Document Revised: 09/09/2020 Document Reviewed: 07/17/2020  [email or web address] Patient Education © 2021 [email or web address]  Managing Your Hypertension  Hypertension, also called high blood pressure, is when the force of the blood pressing against the walls of the arteries is too strong. Arteries are blood vessels that carry blood from your heart throughout your body. Hypertension forces the heart to work harder to pump blood and may cause the arteries to become narrow or stiff.  Understanding blood pressure readings  Your personal target blood pressure may vary depending on your medical conditions, your age, and other factors. A blood pressure reading includes a higher number over a lower number. Ideally, your blood pressure should be below 120/80. You should know that:  The first, or top, number is called the systolic pressure. It is a measure of the pressure in your arteries as your heart beats.  The second, or bottom number, is called the diastolic pressure. It is a measure of the pressure in your arteries as the heart relaxes.  Blood pressure is classified into four stages. Based on your blood pressure reading, your health care provider may use the following stages to determine what type of treatment you need, if any. Systolic pressure and diastolic pressure are measured in a unit called mmHg.  Normal  Systolic pressure: below 120.  Diastolic pressure: below 80.  Elevated  Systolic pressure: 120-129.  Diastolic pressure: below 80.  Hypertension stage 1  Systolic pressure: 130-139.  Diastolic pressure: 80-89.  Hypertension stage 2  Systolic pressure: 140 or above.  Diastolic pressure: 90 or above.  How can this condition affect me?  Managing your hypertension is an important responsibility. Over time, hypertension can damage the arteries and decrease blood flow to important parts of  the body, including the brain, heart, and kidneys. Having untreated or uncontrolled hypertension can lead to:  A heart attack.  A stroke.  A weakened blood vessel (aneurysm).  Heart failure.  Kidney damage.  Eye damage.  Metabolic syndrome.  Memory and concentration problems.  Vascular dementia.  What actions can I take to manage this condition?  Hypertension can be managed by making lifestyle changes and possibly by taking medicines. Your health care provider will help you make a plan to bring your blood pressure within a normal range.  Nutrition    Eat a diet that is high in fiber and potassium, and low in salt (sodium), added sugar, and fat. An example eating plan is called the Dietary Approaches to Stop Hypertension (DASH) diet. To eat this way:  Eat plenty of fresh fruits and vegetables. Try to fill one-half of your plate at each meal with fruits and vegetables.  Eat whole grains, such as whole-wheat pasta, brown rice, or whole-grain bread. Fill about one-fourth of your plate with whole grains.  Eat low-fat dairy products.  Avoid fatty cuts of meat, processed or cured meats, and poultry with skin. Fill about one-fourth of your plate with lean proteins such as fish, chicken without skin, beans, eggs, and tofu.  Avoid pre-made and processed foods. These tend to be higher in sodium, added sugar, and fat.  Reduce your daily sodium intake. Most people with hypertension should eat less than 1,500 mg of sodium a day.    Lifestyle    Work with your health care provider to maintain a healthy body weight or to lose weight. Ask what an ideal weight is for you.  Get at least 30 minutes of exercise that causes your heart to beat faster (aerobic exercise) most days of the week. Activities may include walking, swimming, or biking.  Include exercise to strengthen your muscles (resistance exercise), such as weight lifting, as part of your weekly exercise routine. Try to do these types of exercises for 30 minutes at least 3 days  a week.  Do not use any products that contain nicotine or tobacco, such as cigarettes, e-cigarettes, and chewing tobacco. If you need help quitting, ask your health care provider.  Control any long-term (chronic) conditions you have, such as high cholesterol or diabetes.  Identify your sources of stress and find ways to manage stress. This may include meditation, deep breathing, or making time for fun activities.    Alcohol use  Do not drink alcohol if:  Your health care provider tells you not to drink.  You are pregnant, may be pregnant, or are planning to become pregnant.  If you drink alcohol:  Limit how much you use to:  0-1 drink a day for women.  0-2 drinks a day for men.  Be aware of how much alcohol is in your drink. In the [place] one drink equals one 12 oz bottle of beer (355 mL), one 5 oz glass of wine (148 mL), or one 1½ oz glass of hard liquor (44 mL).  Medicines  Your health care provider may prescribe medicine if lifestyle changes are not enough to get your blood pressure under control and if:  Your systolic blood pressure is 130 or higher.  Your diastolic blood pressure is 80 or higher.  Take medicines only as told by your health care provider. Follow the directions carefully. Blood pressure medicines must be taken as told by your health care provider. The medicine does not work as well when you skip doses. Skipping doses also puts you at risk for problems.  Monitoring  Before you monitor your blood pressure:  Do not smoke, drink caffeinated beverages, or exercise within 30 minutes before taking a measurement.  Use the bathroom and empty your bladder (urinate).  Sit quietly for at least 5 minutes before taking measurements.  Monitor your blood pressure at home as told by your health care provider. To do this:  Sit with your back straight and supported.  Place your feet flat on the floor. Do not cross your legs.  Support your arm on a flat surface, such as a table. Make sure your upper arm is at heart  level.  Each time you measure, take two or three readings one minute apart and record the results.  You may also need to have your blood pressure checked regularly by your health care provider.  General information  Talk with your health care provider about your diet, exercise habits, and other lifestyle factors that may be contributing to hypertension.  Review all the medicines you take with your health care provider because there may be side effects or interactions.  Keep all visits as told by your health care provider. Your health care provider can help you create and adjust your plan for managing your high blood pressure.  Where to find more information  National Heart, Lung, and Blood [place]: www.nhlbi.nih.gov  American Heart Association: www.heart.org  Contact a health care provider if:  You think you are having a reaction to medicines you have taken.  You have repeated (recurrent) headaches.  You feel dizzy.  You have swelling in your ankles.  You have trouble with your vision.  Get help right away if:  You develop a severe headache or confusion.  You have unusual weakness or numbness, or you feel faint.  You have severe pain in your chest or abdomen.  You vomit repeatedly.  You have trouble breathing.  These symptoms may represent a serious problem that is an emergency. Do not wait to see if the symptoms will go away. Get medical help right away. Call your local emergency services (911 in the [place] Do not drive yourself to the hospital.  Summary  Hypertension is when the force of blood pumping through your arteries is too strong. If this condition is not controlled, it may put you at risk for serious complications.  Your personal target blood pressure may vary depending on your medical conditions, your age, and other factors. For most people, a normal blood pressure is less than 120/80.  Hypertension is managed by lifestyle changes, medicines, or both.  Lifestyle changes to help manage hypertension  "include losing weight, eating a healthy, low-sodium diet, exercising more, stopping smoking, and limiting alcohol.  This information is not intended to replace advice given to you by your health care provider. Make sure you discuss any questions you have with your health care provider.  Document Revised: 01/22/2021 Document Reviewed: 11/17/2020  [employer] Patient Education © 2021 [employer]  Critical care medicine: Principles of diagnosis and management in the adult (4th ed., pp. 4326-6231). [place].\"> Crowder's anesthesia (8th ed., pp. 232-250). [place].\">   Advance Directive    Advance directives are legal documents that allow you to make decisions about your health care and medical treatment in case you become unable to communicate for yourself. Advance directives let your wishes be known to family, friends, and health care providers.  Discussing and writing advance directives should happen over time rather than all at once. Advance directives can be changed and updated at any time. There are different types of advance directives, such as:  Medical power of .  Living will.  Do not resuscitate (DNR) order or do not attempt resuscitation (DNAR) order.  Health care proxy and medical power of   A health care proxy is also called a health care agent. This person is appointed to make medical decisions for you when you are unable to make decisions for yourself. Generally, people ask a trusted friend or family member to act as their proxy and represent their preferences. Make sure you have an agreement with your trusted person to act as your proxy. A proxy may have to make a medical decision on your behalf if your wishes are not known.  A medical power of , also called a durable power of  for health care, is a legal document that names your health care proxy. Depending on the laws in your state, the document may need to be:  Signed.  Notarized.  Dated.  Copied.  Witnessed.  Incorporated " into your medical record.  You may also want to appoint a trusted person to manage your money in the event you are unable to do so. This is called a durable power of  for finances. It is a separate legal document from the durable power of  for health care. You may choose your health care proxy or someone different to act as your agent in money matters.  If you do not appoint a proxy, or there is a concern that the proxy is not acting in your best interest, a court may appoint a guardian to act on your behalf.  Living will  A living will is a set of instructions that state your wishes about medical care when you cannot express them yourself. Health care providers should keep a copy of your living will in your medical record. You may want to give a copy to family members or friends. To alert caregivers in case of an emergency, you can place a card in your wallet to let them know that you have a living will and where they can find it. A living will is used if you become:  Terminally ill.  Disabled.  Unable to communicate or make decisions.  The following decisions should be included in your living will:  To use or not to use life support equipment, such as dialysis machines and breathing machines (ventilators).  Whether you want a DNR or DNAR order. This tells health care providers not to use cardiopulmonary resuscitation (CPR) if breathing or heartbeat stops.  To use or not to use tube feeding.  To be given or not to be given food and fluids.  Whether you want comfort (palliative) care when the goal becomes comfort rather than a cure.  Whether you want to donate your organs and tissues.  A living will does not give instructions for distributing your money and property if you should pass away.  DNR or DNAR  A DNR or DNAR order is a request not to have CPR in the event that your heart stops beating or you stop breathing. If a DNR or DNAR order has not been made and shared, a health care provider will try  to help any patient whose heart has stopped or who has stopped breathing. If you plan to have surgery, talk with your health care provider about how your DNR or DNAR order will be followed if problems occur.  What if I do not have an advance directive?  Some states assign family decision makers to act on your behalf if you do not have an advance directive. Each state has its own laws about advance directives. You may want to check with your health care provider, , or state representative about the laws in your state.  Summary  Advance directives are legal documents that allow you to make decisions about your health care and medical treatment in case you become unable to communicate for yourself.  The process of discussing and writing advance directives should happen over time. You can change and update advance directives at any time.  Advance directives may include a medical power of , a living will, and a DNR or DNAR order.  This information is not intended to replace advice given to you by your health care provider. Make sure you discuss any questions you have with your health care provider.  Document Revised: 09/21/2021 Document Reviewed: 09/21/2021  [employer] Patient Education © 2021 [employer]

## 2022-10-03 DIAGNOSIS — I10 ESSENTIAL HYPERTENSION: ICD-10-CM

## 2022-10-03 RX ORDER — LISINOPRIL 5 MG/1
TABLET ORAL
Qty: 90 TABLET | Refills: 0 | Status: SHIPPED | OUTPATIENT
Start: 2022-10-03 | End: 2023-02-06

## 2023-01-17 ENCOUNTER — TRANSCRIBE ORDERS (OUTPATIENT)
Dept: ADMINISTRATIVE | Facility: HOSPITAL | Age: 53
End: 2023-01-17
Payer: COMMERCIAL

## 2023-01-17 DIAGNOSIS — Z12.31 VISIT FOR SCREENING MAMMOGRAM: Primary | ICD-10-CM

## 2023-02-06 DIAGNOSIS — I10 ESSENTIAL HYPERTENSION: ICD-10-CM

## 2023-02-06 RX ORDER — LISINOPRIL 5 MG/1
TABLET ORAL
Qty: 90 TABLET | Refills: 1 | Status: SHIPPED | OUTPATIENT
Start: 2023-02-06

## 2023-03-10 ENCOUNTER — HOSPITAL ENCOUNTER (OUTPATIENT)
Dept: MAMMOGRAPHY | Facility: HOSPITAL | Age: 53
Discharge: HOME | End: 2023-03-10
Admitting: PHYSICIAN ASSISTANT
Payer: COMMERCIAL

## 2023-03-10 DIAGNOSIS — Z12.31 VISIT FOR SCREENING MAMMOGRAM: ICD-10-CM

## 2023-03-10 PROCEDURE — 77063 BREAST TOMOSYNTHESIS BI: CPT | Performed by: RADIOLOGY

## 2023-03-10 PROCEDURE — 77067 SCR MAMMO BI INCL CAD: CPT | Performed by: RADIOLOGY

## 2023-03-10 PROCEDURE — 77063 BREAST TOMOSYNTHESIS BI: CPT

## 2023-03-10 PROCEDURE — 77067 SCR MAMMO BI INCL CAD: CPT

## 2023-04-17 ENCOUNTER — OFFICE VISIT (OUTPATIENT)
Dept: INTERNAL MEDICINE | Facility: CLINIC | Age: 53
End: 2023-04-17
Payer: COMMERCIAL

## 2023-04-17 ENCOUNTER — LAB (OUTPATIENT)
Dept: LAB | Facility: HOSPITAL | Age: 53
End: 2023-04-17
Payer: COMMERCIAL

## 2023-04-17 VITALS
BODY MASS INDEX: 30.33 KG/M2 | OXYGEN SATURATION: 100 % | HEART RATE: 76 BPM | SYSTOLIC BLOOD PRESSURE: 132 MMHG | TEMPERATURE: 98 F | HEIGHT: 69 IN | DIASTOLIC BLOOD PRESSURE: 70 MMHG | WEIGHT: 204.8 LBS

## 2023-04-17 DIAGNOSIS — I10 ESSENTIAL HYPERTENSION: ICD-10-CM

## 2023-04-17 DIAGNOSIS — M25.50 ARTHRALGIA OF MULTIPLE JOINTS: ICD-10-CM

## 2023-04-17 DIAGNOSIS — E78.00 HYPERCHOLESTEROLEMIA: Chronic | ICD-10-CM

## 2023-04-17 DIAGNOSIS — M79.89 LEG SWELLING: ICD-10-CM

## 2023-04-17 DIAGNOSIS — M05.9 RHEUMATOID ARTHRITIS WITH POSITIVE RHEUMATOID FACTOR, INVOLVING UNSPECIFIED SITE: ICD-10-CM

## 2023-04-17 DIAGNOSIS — E55.9 VITAMIN D DEFICIENCY: ICD-10-CM

## 2023-04-17 DIAGNOSIS — R30.0 DYSURIA: ICD-10-CM

## 2023-04-17 DIAGNOSIS — R10.2 PELVIC PRESSURE IN FEMALE: Primary | Chronic | ICD-10-CM

## 2023-04-17 LAB
BILIRUB BLD-MCNC: NEGATIVE MG/DL
CLARITY, POC: CLEAR
COLOR UR: YELLOW
EXPIRATION DATE: ABNORMAL
GLUCOSE UR STRIP-MCNC: NEGATIVE MG/DL
KETONES UR QL: NEGATIVE
LEUKOCYTE EST, POC: ABNORMAL
Lab: ABNORMAL
NITRITE UR-MCNC: NEGATIVE MG/ML
PH UR: 6.5 [PH] (ref 5–8)
PROT UR STRIP-MCNC: NEGATIVE MG/DL
RBC # UR STRIP: NEGATIVE /UL
SP GR UR: 1.01 (ref 1–1.03)
UROBILINOGEN UR QL: ABNORMAL

## 2023-04-17 RX ORDER — FLUCONAZOLE 150 MG/1
150 TABLET ORAL ONCE
Qty: 1 TABLET | Refills: 0 | Status: SHIPPED | OUTPATIENT
Start: 2023-04-17 | End: 2023-04-17

## 2023-04-17 NOTE — PROGRESS NOTES
[facility]     Tanner Mills  1970   [id number]      Patient Care Team:  Aimee Levine PA-C as PCP - General (Internal Medicine)    Chief Complaint   Patient presents with   • Brain Fog   • Pelvic Pressure            HPI  Patient is a 52 y.o. female presents with       CHRONIC CONDITIONS    The patient presents today for pelvic pressure.    Pelvic pressure  The patient has been experiencing vaginal pressure for approximately 6 months. She states it is usually at the end of the day. The patient reports it is mostly vaginal. Not worsening.  She wants to make sure there is no prolapse. The patient does have burning, but not necessarily with urination. She wonders if it is not from sitting all the time. She wears Spanks every day. The patient denies any vaginal discharge. She denies any urine leakage or urgency. The patient urinates a lot in the morning sometimes. She always has a good volume of urine upon urination. The patient denies any itching. She denies any discomfort with sex. The patient reports if she lies on her back and pulls her knees up it feels better. She denies hysterectomy or HX pelvic surgery. The patient does note having an episiotomy with both of her childbirths. She denies constipation. The patient will try not wearing Spanks for a while and get some support hose.    Joint pain  The patient states that she has always had a lot of swelling and stiffness in her joints. She takes a diuretic. The patient reports it is worse, mainly in her feet, ankles, legs, and fingers. She feels like the actual joint is swelling. Her ankle is not tender when squeezed, but she can feel it. She states that her wrist is tender. The patient was diagnosed with rheumatoid arthritis in 2015 by her general practitioner. She never went to a rheumatologist. The patient does not think she has had a positive rheumatoid factor.   Aimee checked the rheumatoid factor in 2021, and it was negative  then. The patient feels her joint pain is worse than it was in 2021. She has not had an x-ray of her hands. The patient is agreeable to a referral to rheumatology. She tries to drink a lot of water. The patient has been walking 20 minutes in the morning. She gets tired and her feet and ankles are sore. When she gets out of bed in the morning they are swollen and stiff. She walks very gingerly for 1 to 2 hours, and then it gets better. Her calf muscles also hurt when waking up in the morning. She also reports having muscle cramps.     The patient has been on hydrochlorothiazide daily for at least 3 to 4 years. If she misses a dose, she gets more swelling. She does not think she has varicose veins. The patient is a banker and sits all day. She does not wear support hose. The patient tries to do pelvic floor yoga. She is fasting today.    Vitamin D deficiency   Her vitamin D was low in 2021. She is not taking a vitamin D supplement.     Fatigue  The patient states that she feels tired all the time. She feels foggy. The patient has felt foggy for approximately 6 months. She denies having had COVID-19. She reports she does not make mistakes, but she feels slower than usual.     Menopause  The patient has not had her menses in 1 year this 07/2023. She states it was 10 months from 07/2022 to the last one. The patient has mild hot flashes and night sweats. She had a very high pressure job that has finally backed off a little. The patient has been working 60 hours a week for 2 years. She asked if she needs hormones, but also notes she is not a big fan of hormones.    Hypertension  The patient's blood pressure is slightly elevated today.      Hypercholesterolemia   Last 07/2022 her LDL was 163 mg/dL. She will try to get more exercise and cut down on the sugars and carbohydrates, and eat fewer glutens.     Weight management   She has been trying to cut back on fats and sugars. The patient has gained weight. She has gained 5  "pounds over 07/2022. The patient takes some vitamins. She asked about the women's probiotics. The patient tries to do pelvic floor yoga.    Health maintenance  The patient states Aimee has been doing her GYN exams. Her last Pap smear was in 09/2020. She states that she is on a 3-year plan, due in 09/2023.       History reviewed. No pertinent past medical history.    History reviewed. No pertinent surgical history.    Family History   Problem Relation Age of Onset   • Breast cancer Mother 58   • Ovarian cancer Neg Hx        Social History     Socioeconomic History   • Marital status:    Tobacco Use   • Smoking status: Never   • Smokeless tobacco: Never   Substance and Sexual Activity   • Alcohol use: No   • Drug use: No   • Sexual activity: Defer       No Known Allergies      Vital Signs  Vitals:    04/17/23 1006   BP: 132/70   BP Location: Left arm   Patient Position: Sitting   Cuff Size: Adult   Pulse: 76   Temp: 98 °F (36.7 °C)   TempSrc: Infrared   SpO2: 100%   Weight: 92.9 kg (204 lb 12.8 oz)   Height: 175.3 cm (69.02\")     Body mass index is 30.23 kg/m².  BMI is >= 30 and <35. (Class 1 Obesity). The following options were offered after discussion;: weight loss educational material (shared in after visit summary), exercise counseling/recommendations and nutrition counseling/recommendations        Current Outpatient Medications:   •  hydroCHLOROthiazide (HYDRODIURIL) 25 MG tablet, Take 1 tablet by mouth once daily, Disp: 180 tablet, Rfl: 1  •  lisinopril (PRINIVIL,ZESTRIL) 5 MG tablet, Take 1 tablet by mouth once daily, Disp: 90 tablet, Rfl: 1    Physical Exam:    Physical Exam  Vitals and nursing note reviewed.   Constitutional:       Appearance: She is well-developed.      Comments:  Obesity.   HENT:      Head: Normocephalic.   Eyes:      Conjunctiva/sclera: Conjunctivae normal.      Pupils: Pupils are equal, round, and reactive to light.   Neck:      Thyroid: No thyromegaly.   Cardiovascular:      " Rate and Rhythm: Normal rate and regular rhythm.      Heart sounds: Normal heart sounds.   Pulmonary:      Effort: Pulmonary effort is normal.      Breath sounds: Normal breath sounds.   Abdominal:      Hernia: There is no hernia in the left inguinal area or right inguinal area.   Genitourinary:     Exam position: Lithotomy position.      Pubic Area: No rash.       Labia:         Right: No rash, tenderness or lesion.         Left: No rash, tenderness or lesion.       Urethra: No prolapse, urethral swelling or urethral lesion.      Vagina: Vaginal discharge present. No tenderness.      Cervix: Normal.      Adnexa: Right adnexa normal.      Comments: Benign. There is a bit of thick white discharge. No prolapse of organs noted on exam today. Vaginal mucosa otherwise normal. Cervix appears healthy. She will be due for Pap smear in 09/2023. Urinalysis today was negative for any infection or blood. Nothing seen on exam to explain her pelvic pressure.  Musculoskeletal:         General: Normal range of motion.      Cervical back: Normal range of motion and neck supple.      Comments: Couple of the DIP joints with Heberden's nodes. No swollen or red or inflamed joints. No edema in ankles.   Lymphadenopathy:      Cervical: No cervical adenopathy.   Neurological:      Mental Status: She is alert and oriented to person, place, and time.   Psychiatric:         Thought Content: Thought content normal.                 Results Review:    I reviewed the patient's new clinical results.    CMP:  Lab Results   Component Value Date    BUN 14 04/17/2023    CREATININE 0.74 04/17/2023    EGFRIFNONA 106 07/13/2021    BCR 18.9 04/17/2023     04/17/2023    K 4.8 04/17/2023    CO2 27.1 04/17/2023    CALCIUM 9.8 04/17/2023    PROTENTOTREF 7.4 04/17/2023    ALBUMIN 4.5 04/17/2023    LABGLOBREF 2.9 04/17/2023    LABIL2 1.6 04/17/2023    BILITOT 0.5 04/17/2023    ALKPHOS 120 (H) 04/17/2023    AST 13 04/17/2023    ALT 14 04/17/2023      HbA1c:  Lab Results   Component Value Date    HGBA1C 5.60 07/18/2022    HGBA1C 5.10 09/09/2020     Microalbumin:  Lab Results   Component Value Date    MICROALBUR <1.2 07/18/2022     Lipid Panel  Lab Results   Component Value Date    CHOL 242 (H) 07/18/2022    TRIG 55 04/17/2023    HDL 79 (H) 04/17/2023     (H) 04/17/2023    AST 13 04/17/2023    ALT 14 04/17/2023     Labs Reviewed:   In 2021 rheumatoid factor was negative.   Sed rate was a little elevated.   Sugar, kidney, and liver were all good.   Vitamin D was low.  Thyroid was good.  C-reactive protein was normal.  Test for lupus was negative.   Urinalysis today showed no infection or blood.      Medication Review: Medications reviewed and noted  Patient Instructions   Problem List Items Addressed This Visit        Cardiac and Vasculature    Hypercholesterolemia (Chronic)    Current Assessment & Plan     Continue to increase regular exercise and physical activity. Continue to decrease fats and sugars in the diet.         Relevant Orders    Lipid Panel (Completed)    TSH (Completed)    Essential hypertension    Overview     Taking  lisinopril 5mg and hydrochlorothiazide 25mg tablet.           Current Assessment & Plan     Continue lisinopril and hydrochlorothiazide daily. Continue to avoid salt in the diet. Increase regular exercise and walking.         Relevant Medications    hydroCHLOROthiazide (HYDRODIURIL) 25 MG tablet    lisinopril (PRINIVIL,ZESTRIL) 5 MG tablet    Other Relevant Orders    CBC & Differential (Completed)    Comprehensive Metabolic Panel (Completed)       Endocrine and Metabolic    Vitamin D deficiency    Current Assessment & Plan     We will recheck vitamin D level. She has not been taking a supplement for at least 6 weeks or so.         Relevant Orders    Vitamin D,25-Hydroxy (Completed)       Gastrointestinal Abdominal     Pelvic pressure in female - Primary (Chronic)    Current Assessment & Plan     The GYN exam today is benign.  There is a bit of yeast, thick, white discharge. No prolapse of organs noted on exam today. Vaginal mucosa otherwise normal. Cervix appears healthy. She will be due for Pap smear in 09/2023. Urinalysis today was negative for any infection or blood. Nothing seen on exam to explain her pelvic pressure. I wonder if it is due to myalgia with a very stressful job sitting at the computer all day. She has found that doing a little yoga seems to help. I recommended signing up for a yoga class, [employer], or Pilates. Also doing a few stretches throughout the workday to relax the muscles.            Musculoskeletal and Injuries    Rheumatoid arthritis (HCC)    Overview     This diagnosis is in question.  She has never seen a rheumatologist.  7/2021 labs revealed neg RF and STANISLAW. Normal c-rp but mildly elevated sed rate.          Current Assessment & Plan     We are rechecking some labs today. I am referring her to a rheumatologist to see what they think about her arthralgias.         Arthralgia of multiple joints    Overview     Pain in multiple joints, mainly hands and feet and ankles.  She also describes muscle pains and tightness.  No history of red or swollen or inflamed joints.         Current Assessment & Plan     We are referring to a rheumatologist. I recommended wearing support hose to help with the swelling, pain, and aching in the feet and ankles. Wear good supportive shoes. She may take Tylenol as needed for joint pain.          Relevant Orders    Ambulatory Referral to Rheumatology    C-reactive Protein (Completed)    Sedimentation Rate (Completed)       Symptoms and Signs    Leg swelling    Overview     Likely a result from sedentary job.  She has been taking HCTZ daily for several years.         Current Assessment & Plan     She will continue taking hydrochlorothiazide daily. I recommend wearing support hose every day to work. I think her legs and feet will be more comfortable.        Other Visit Diagnoses      Dysuria        Relevant Orders    POC Urinalysis Dipstick, Automated (Completed)             Diagnosis Plan   1. Pelvic pressure in female        2. Arthralgia of multiple joints  Ambulatory Referral to Rheumatology    C-reactive Protein    Sedimentation Rate      3. Leg swelling        4. Essential hypertension  CBC & Differential    Comprehensive Metabolic Panel      5. Rheumatoid arthritis with positive rheumatoid factor, involving unspecified site        6. Hypercholesterolemia  Lipid Panel    TSH      7. Vitamin D deficiency  Vitamin D,25-Hydroxy      8. Dysuria  POC Urinalysis Dipstick, Automated        Follow-up: She will come back to see Aimee in 07/2023.     I spent 60 minutes caring for Tanner on this date of service. This time includes time spent by me in the following activities:preparing for the visit, reviewing tests, obtaining and/or reviewing a separately obtained history, performing a medically appropriate examination and/or evaluation , counseling and educating the patient/family/caregiver, ordering medications, tests, or procedures, referring and communicating with other health care professionals  and documenting information in the medical record    Plan of care reviewed with patient at the conclusion of today's visit. Education was provided regarding diagnosis, management, and any prescribed or recommended OTC medications.Patient verbalizes understanding of and agreement with management plan.         Nani De Luna MD      Transcribed from ambient dictation for Nani De Luna MD by Martha Silva.  04/17/23   15:21 EDT    Patient or patient representative verbalized consent to the visit recording.  I have personally performed the services described in this document as transcribed by the above individual, and it is both accurate and complete.

## 2023-04-18 LAB
25(OH)D3+25(OH)D2 SERPL-MCNC: 31.3 NG/ML (ref 30–100)
ALBUMIN SERPL-MCNC: 4.5 G/DL (ref 3.5–5.2)
ALBUMIN/GLOB SERPL: 1.6 G/DL
ALP SERPL-CCNC: 120 U/L (ref 39–117)
ALT SERPL-CCNC: 14 U/L (ref 1–33)
AST SERPL-CCNC: 13 U/L (ref 1–32)
BASOPHILS # BLD AUTO: 0.04 10*3/MM3 (ref 0–0.2)
BASOPHILS NFR BLD AUTO: 0.7 % (ref 0–1.5)
BILIRUB SERPL-MCNC: 0.5 MG/DL (ref 0–1.2)
BUN SERPL-MCNC: 14 MG/DL (ref 6–20)
BUN/CREAT SERPL: 18.9 (ref 7–25)
CALCIUM SERPL-MCNC: 9.8 MG/DL (ref 8.6–10.5)
CHLORIDE SERPL-SCNC: 104 MMOL/L (ref 98–107)
CHOLEST SERPL-MCNC: 242 MG/DL (ref 0–200)
CO2 SERPL-SCNC: 27.1 MMOL/L (ref 22–29)
CREAT SERPL-MCNC: 0.74 MG/DL (ref 0.57–1)
CRP SERPL-MCNC: <0.3 MG/DL (ref 0–0.5)
EGFRCR SERPLBLD CKD-EPI 2021: 98.1 ML/MIN/1.73
EOSINOPHIL # BLD AUTO: 0.11 10*3/MM3 (ref 0–0.4)
EOSINOPHIL NFR BLD AUTO: 2 % (ref 0.3–6.2)
ERYTHROCYTE [DISTWIDTH] IN BLOOD BY AUTOMATED COUNT: 12 % (ref 12.3–15.4)
ERYTHROCYTE [SEDIMENTATION RATE] IN BLOOD BY WESTERGREN METHOD: 40 MM/HR (ref 0–30)
GLOBULIN SER CALC-MCNC: 2.9 GM/DL
GLUCOSE SERPL-MCNC: 106 MG/DL (ref 65–99)
HCT VFR BLD AUTO: 41.2 % (ref 34–46.6)
HDLC SERPL-MCNC: 79 MG/DL (ref 40–60)
HGB BLD-MCNC: 14.7 G/DL (ref 12–15.9)
IMM GRANULOCYTES # BLD AUTO: 0.01 10*3/MM3 (ref 0–0.05)
IMM GRANULOCYTES NFR BLD AUTO: 0.2 % (ref 0–0.5)
LDLC SERPL CALC-MCNC: 154 MG/DL (ref 0–100)
LYMPHOCYTES # BLD AUTO: 1.9 10*3/MM3 (ref 0.7–3.1)
LYMPHOCYTES NFR BLD AUTO: 35.1 % (ref 19.6–45.3)
MCH RBC QN AUTO: 31.2 PG (ref 26.6–33)
MCHC RBC AUTO-ENTMCNC: 35.7 G/DL (ref 31.5–35.7)
MCV RBC AUTO: 87.5 FL (ref 79–97)
MONOCYTES # BLD AUTO: 0.34 10*3/MM3 (ref 0.1–0.9)
MONOCYTES NFR BLD AUTO: 6.3 % (ref 5–12)
NEUTROPHILS # BLD AUTO: 3.01 10*3/MM3 (ref 1.7–7)
NEUTROPHILS NFR BLD AUTO: 55.7 % (ref 42.7–76)
NRBC BLD AUTO-RTO: 0 /100 WBC (ref 0–0.2)
PLATELET # BLD AUTO: 252 10*3/MM3 (ref 140–450)
POTASSIUM SERPL-SCNC: 4.8 MMOL/L (ref 3.5–5.2)
PROT SERPL-MCNC: 7.4 G/DL (ref 6–8.5)
RBC # BLD AUTO: 4.71 10*6/MM3 (ref 3.77–5.28)
SODIUM SERPL-SCNC: 143 MMOL/L (ref 136–145)
TRIGL SERPL-MCNC: 55 MG/DL (ref 0–150)
TSH SERPL DL<=0.005 MIU/L-ACNC: 0.8 UIU/ML (ref 0.27–4.2)
VLDLC SERPL CALC-MCNC: 9 MG/DL (ref 5–40)
WBC # BLD AUTO: 5.41 10*3/MM3 (ref 3.4–10.8)

## 2023-04-18 NOTE — ASSESSMENT & PLAN NOTE
Continue to increase regular exercise and physical activity. Continue to decrease fats and sugars in the diet.

## 2023-04-18 NOTE — ASSESSMENT & PLAN NOTE
We are referring to a rheumatologist. I recommended wearing support hose to help with the swelling, pain, and aching in the feet and ankles. Wear good supportive shoes. She may take Tylenol as needed for joint pain.

## 2023-04-18 NOTE — ASSESSMENT & PLAN NOTE
She will continue taking hydrochlorothiazide daily. I recommend wearing support hose every day to work. I think her legs and feet will be more comfortable.

## 2023-04-18 NOTE — ASSESSMENT & PLAN NOTE
We are rechecking some labs today. I am referring her to a rheumatologist to see what they think about her arthralgias.

## 2023-04-18 NOTE — ASSESSMENT & PLAN NOTE
Continue lisinopril and hydrochlorothiazide daily. Continue to avoid salt in the diet. Increase regular exercise and walking.

## 2023-04-18 NOTE — PATIENT INSTRUCTIONS
Patient Instructions   Problem List Items Addressed This Visit          Cardiac and Vasculature    Hypercholesterolemia (Chronic)    Current Assessment & Plan     Continue to increase regular exercise and physical activity. Continue to decrease fats and sugars in the diet.         Relevant Orders    Lipid Panel (Completed)    TSH (Completed)    Essential hypertension    Overview     Taking  lisinopril 5mg and hydrochlorothiazide 25mg tablet.           Current Assessment & Plan     Continue lisinopril and hydrochlorothiazide daily. Continue to avoid salt in the diet. Increase regular exercise and walking.         Relevant Medications    hydroCHLOROthiazide (HYDRODIURIL) 25 MG tablet    lisinopril (PRINIVIL,ZESTRIL) 5 MG tablet    Other Relevant Orders    CBC & Differential (Completed)    Comprehensive Metabolic Panel (Completed)       Endocrine and Metabolic    Vitamin D deficiency    Current Assessment & Plan     We will recheck vitamin D level. She has not been taking a supplement for at least 6 weeks or so.         Relevant Orders    Vitamin D,25-Hydroxy (Completed)       Gastrointestinal Abdominal     Pelvic pressure in female - Primary (Chronic)    Current Assessment & Plan     The GYN exam today is benign. There is a bit of yeast, thick, white discharge. No prolapse of organs noted on exam today. Vaginal mucosa otherwise normal. Cervix appears healthy. She will be due for Pap smear in 09/2023. Urinalysis today was negative for any infection or blood. Nothing seen on exam to explain her pelvic pressure. I wonder if it is due to myalgia with a very stressful job sitting at the computer all day. She has found that doing a little yoga seems to help. I recommended signing up for a yoga class, [employer], or Pilates. Also doing a few stretches throughout the workday to relax the muscles.            Musculoskeletal and Injuries    Rheumatoid arthritis (HCC)    Overview     This diagnosis is in question.  She has never  seen a rheumatologist.  7/2021 labs revealed neg RF and STANISLAW. Normal c-rp but mildly elevated sed rate.          Current Assessment & Plan     We are rechecking some labs today. I am referring her to a rheumatologist to see what they think about her arthralgias.         Arthralgia of multiple joints    Overview     Pain in multiple joints, mainly hands and feet and ankles.  She also describes muscle pains and tightness.  No history of red or swollen or inflamed joints.         Current Assessment & Plan     We are referring to a rheumatologist. I recommended wearing support hose to help with the swelling, pain, and aching in the feet and ankles. Wear good supportive shoes. She may take Tylenol as needed for joint pain.          Relevant Orders    Ambulatory Referral to Rheumatology    C-reactive Protein (Completed)    Sedimentation Rate (Completed)       Symptoms and Signs    Leg swelling    Overview     Likely a result from sedentary job.  She has been taking HCTZ daily for several years.         Current Assessment & Plan     She will continue taking hydrochlorothiazide daily. I recommend wearing support hose every day to work. I think her legs and feet will be more comfortable.          Other Visit Diagnoses       Dysuria        Relevant Orders    POC Urinalysis Dipstick, Automated (Completed)            BMI for Adults  What is BMI?  Body mass index (BMI) is a number that is calculated from a person's weight and height. BMI can help estimate how much of a person's weight is composed of fat. BMI does not measure body fat directly. Rather, it is an alternative to procedures that directly measure body fat, which can be difficult and expensive.  BMI can help identify people who may be at higher risk for certain medical problems.  What are BMI measurements used for?  BMI is used as a screening tool to identify possible weight problems. It helps determine whether a person is obese, overweight, a healthy weight, or  "underweight.  BMI is useful for:  Identifying a weight problem that may be related to a medical condition or may increase the risk for medical problems.  Promoting changes, such as changes in diet and exercise, to help reach a healthy weight. BMI screening can be repeated to see if these changes are working.  How is BMI calculated?  BMI involves measuring your weight in relation to your height. Both height and weight are measured, and the BMI is calculated from those numbers. This can be done either in English ([place] or metric measurements. Note that charts and online BMI calculators are available to help you find your BMI quickly and easily without having to do these calculations yourself.  To calculate your BMI in English ([place] measurements:    Measure your weight in pounds (lb).  Multiply the number of pounds by 703.  For example, for a person who weighs 180 lb, multiply that number by 703, which equals 126,540.  Measure your height in inches. Then multiply that number by itself to get a measurement called \"inches squared.\"  For example, for a person who is 70 inches tall, the \"inches squared\" measurement is 70 inches x 70 inches, which equals 4,900 inches squared.  Divide the total from step 2 (number of lb x 703) by the total from step 3 (inches squared): 126,540 ÷ 4,900 = 25.8. This is your BMI.  To calculate your BMI in metric measurements:  Measure your weight in kilograms (kg).  Measure your height in meters (m). Then multiply that number by itself to get a measurement called \"meters squared.\"  For example, for a person who is 1.75 m tall, the \"meters squared\" measurement is 1.75 m x 1.75 m, which is equal to 3.1 meters squared.  Divide the number of kilograms (your weight) by the meters squared number. In this example: 70 ÷ 3.1 = 22.6. This is your BMI.  What do the results mean?  BMI charts are used to identify whether you are underweight, normal weight, overweight, or obese. The following guidelines " will be used:  Underweight: BMI less than 18.5.  Normal weight: BMI between 18.5 and 24.9.  Overweight: BMI between 25 and 29.9.  Obese: BMI of 30 or above.  Keep these notes in mind:  Weight includes both fat and muscle, so someone with a muscular build, such as an athlete, may have a BMI that is higher than 24.9. In cases like these, BMI is not an accurate measure of body fat.  To determine if excess body fat is the cause of a BMI of 25 or higher, further assessments may need to be done by a health care provider.  BMI is usually interpreted in the same way for men and women.  Where to find more information  For more information about BMI, including tools to quickly calculate your BMI, go to these websites:  Centers for Disease Control and Prevention: www.cdc.gov  American Heart Association: www.heart.org  National Heart, Lung, and Blood [employer]: www.nhlbi.nih.gov  Summary  Body mass index (BMI) is a number that is calculated from a person's weight and height.  BMI may help estimate how much of a person's weight is composed of fat. BMI can help identify those who may be at higher risk for certain medical problems.  BMI can be measured using English measurements or metric measurements.  BMI charts are used to identify whether you are underweight, normal weight, overweight, or obese.  This information is not intended to replace advice given to you by your health care provider. Make sure you discuss any questions you have with your health care provider.  Document Revised: 09/09/2020 Document Reviewed: 07/17/2020  [employer] Patient Education © 2022 [employer]

## 2023-04-18 NOTE — ASSESSMENT & PLAN NOTE
The GYN exam today is benign. There is a bit of yeast, thick, white discharge. No prolapse of organs noted on exam today. Vaginal mucosa otherwise normal. Cervix appears healthy. She will be due for Pap smear in 09/2023. Urinalysis today was negative for any infection or blood. Nothing seen on exam to explain her pelvic pressure. I wonder if it is due to myalgia with a very stressful job sitting at the computer all day. She has found that doing a little yoga seems to help. I recommended signing up for a yoga class, [employer], or Pilates. Also doing a few stretches throughout the workday to relax the muscles.

## 2023-04-20 RX ORDER — ACETAMINOPHEN 160 MG
2000 TABLET,DISINTEGRATING ORAL DAILY
Qty: 30 CAPSULE | Refills: 11
Start: 2023-04-20 | End: 2024-04-19

## 2023-08-15 ENCOUNTER — TELEPHONE (OUTPATIENT)
Dept: INTERNAL MEDICINE | Facility: CLINIC | Age: 53
End: 2023-08-15

## 2023-08-15 ENCOUNTER — LAB (OUTPATIENT)
Dept: LAB | Facility: HOSPITAL | Age: 53
End: 2023-08-15
Payer: COMMERCIAL

## 2023-08-15 ENCOUNTER — OFFICE VISIT (OUTPATIENT)
Dept: INTERNAL MEDICINE | Facility: CLINIC | Age: 53
End: 2023-08-15
Payer: COMMERCIAL

## 2023-08-15 VITALS
DIASTOLIC BLOOD PRESSURE: 98 MMHG | TEMPERATURE: 97.8 F | HEIGHT: 69 IN | BODY MASS INDEX: 29.86 KG/M2 | WEIGHT: 201.6 LBS | OXYGEN SATURATION: 98 % | HEART RATE: 74 BPM | SYSTOLIC BLOOD PRESSURE: 122 MMHG

## 2023-08-15 DIAGNOSIS — R42 DIZZINESS: ICD-10-CM

## 2023-08-15 DIAGNOSIS — R40.20 LOSS OF CONSCIOUSNESS: Primary | ICD-10-CM

## 2023-08-15 DIAGNOSIS — I10 ESSENTIAL HYPERTENSION: ICD-10-CM

## 2023-08-15 PROCEDURE — 99214 OFFICE O/P EST MOD 30 MIN: CPT | Performed by: PHYSICIAN ASSISTANT

## 2023-08-15 NOTE — PATIENT INSTRUCTIONS
Advance Directive    Advance directives are legal documents that allow you to make decisions about your health care and medical treatment in case you become unable to communicate for yourself. Advance directives let your wishes be known to family, friends, and health care providers.  Discussing and writing advance directives should happen over time rather than all at once. Advance directives can be changed and updated at any time. There are different types of advance directives, such as:  Medical power of .  Living will.  Do not resuscitate (DNR) order or do not attempt resuscitation (DNAR) order.  Health care proxy and medical power of   A health care proxy is also called a health care agent. This person is appointed to make medical decisions for you when you are unable to make decisions for yourself. Generally, people ask a trusted friend or family member to act as their proxy and represent their preferences. Make sure you have an agreement with your trusted person to act as your proxy. A proxy may have to make a medical decision on your behalf if your wishes are not known.  A medical power of , also called a durable power of  for health care, is a legal document that names your health care proxy. Depending on the laws in your state, the document may need to be:  Signed.  Notarized.  Dated.  Copied.  Witnessed.  Incorporated into your medical record.  You may also want to appoint a trusted person to manage your money in the event you are unable to do so. This is called a durable power of  for finances. It is a separate legal document from the durable power of  for health care. You may choose your health care proxy or someone different to act as your agent in money matters.  If you do not appoint a proxy, or there is a concern that the proxy is not acting in your best interest, a court may appoint a guardian to act on your behalf.  Living will  A living will is a set  of instructions that state your wishes about medical care when you cannot express them yourself. Health care providers should keep a copy of your living will in your medical record. You may want to give a copy to family members or friends. To alert caregivers in case of an emergency, you can place a card in your wallet to let them know that you have a living will and where they can find it. A living will is used if you become:  Terminally ill.  Disabled.  Unable to communicate or make decisions.  The following decisions should be included in your living will:  To use or not to use life support equipment, such as dialysis machines and breathing machines (ventilators).  Whether you want a DNR or DNAR order. This tells health care providers not to use cardiopulmonary resuscitation (CPR) if breathing or heartbeat stops.  To use or not to use tube feeding.  To be given or not to be given food and fluids.  Whether you want comfort (palliative) care when the goal becomes comfort rather than a cure.  Whether you want to donate your organs and tissues.  A living will does not give instructions for distributing your money and property if you should pass away.  DNR or DNAR  A DNR or DNAR order is a request not to have CPR in the event that your heart stops beating or you stop breathing. If a DNR or DNAR order has not been made and shared, a health care provider will try to help any patient whose heart has stopped or who has stopped breathing. If you plan to have surgery, talk with your health care provider about how your DNR or DNAR order will be followed if problems occur.  What if I do not have an advance directive?  Some states assign family decision makers to act on your behalf if you do not have an advance directive. Each state has its own laws about advance directives. You may want to check with your health care provider, , or state representative about the laws in your state.  Summary  Advance directives are  legal documents that allow you to make decisions about your health care and medical treatment in case you become unable to communicate for yourself.  The process of discussing and writing advance directives should happen over time. You can change and update advance directives at any time.  Advance directives may include a medical power of , a living will, and a DNR or DNAR order.  This information is not intended to replace advice given to you by your health care provider. Make sure you discuss any questions you have with your health care provider.  Document Revised: 09/21/2021 Document Reviewed: 09/21/2021  [employer] Patient Education c 2023 [employer]

## 2023-08-15 NOTE — PROGRESS NOTES
"[facility] Internal Medicine    Tanner Mills  1970   [id number]      Patient Care Team:  Aimee Levine PA-C as PCP - General (Internal Medicine)    Chief Complaint::   Chief Complaint   Patient presents with    Dizziness     Describes it as more disoriented than dizzy        HPI  Tanner is a 53-year-old female date of birth 1970 who presents today for evaluation of dizziness.  Past medical history significant for hypertension, vitamin D deficiency, and rheumatoid arthritis.  About 4 weeks ago, she was laying in bed at ~ 1am.  Felt hot and sweaty, got up and went down to eat a brownie, as she thought her blood sugar may be too low.  Although she picked up the fork and was trying to scoop the brownie, she felt that her hand would not \"work.\"  She could not get her arm to move.  The next thing she remembers, she woke up and had fallen in the living room through the TV stand.  She landed on the right side of her face.  She tried to stand up to get her phone, but could not.  She then crawled up the stairs to her phone but passed out again.  She woke up again at 3 AM continued to feel very dizzy until falling back asleep until 7 AM.  She reports at 7 AM the dizziness had left.   Since this episode, she has had persistent fatigue.  She has bouts of dizziness, not related to meals or blood pressure medication timing.  She denies headache, chest pain, shortness of breath, palpitations, or recent illness.  She has increased stress.    She does have times where she feels disorientated, worse at night. She goes to bed at 10-11pm, but finally falls asleep at 3am. Then wakes up at 6:30-7am for work. She has been sleeping like this for 3 months.  She walks for 20 minutes every day.  After 10 minutes, she is now tired.  Over this past weekend, she stood up, had heaviness to her legs, and \"collapsed\" to the floor, reporting that \"her legs would not work.\"    Patient Active Problem List   Diagnosis    " Rheumatoid arthritis    Essential hypertension    Myalgia    Nausea    BMI 28.0-28.9,adult    Vitamin D deficiency    Routine gynecological examination    PMS (premenstrual syndrome)    Irregular menses    Dizziness    Colon cancer screening    Menopausal symptoms    Leg swelling    Routine physical examination    Encounter for screening mammogram for malignant neoplasm of breast    Pelvic pressure in female    Hypercholesterolemia    Arthralgia of multiple joints    Loss of consciousness        No past medical history on file.    No past surgical history on file.    Family History   Problem Relation Age of Onset    Breast cancer Mother 58    Ovarian cancer Neg Hx        Social History     Socioeconomic History    Marital status:    Tobacco Use    Smoking status: Never    Smokeless tobacco: Never   Substance and Sexual Activity    Alcohol use: No    Drug use: No    Sexual activity: Defer       No Known Allergies    Review of Systems   Constitutional:  Positive for fatigue. Negative for activity change.   HENT:  Negative for congestion, sinus pressure, sore throat, swollen glands, tinnitus and trouble swallowing.    Eyes:         Worsening peripheral vision   Cardiovascular:  Negative for chest pain, palpitations and leg swelling.   Gastrointestinal:  Negative for abdominal distention, abdominal pain, constipation, diarrhea and nausea.   Endocrine: Positive for heat intolerance. Negative for cold intolerance.   Musculoskeletal:  Negative for arthralgias, back pain, gait problem and joint swelling.   Neurological:  Positive for dizziness, weakness and confusion. Negative for facial asymmetry.   Psychiatric/Behavioral:  Positive for sleep disturbance and stress.       Vital Signs  Vitals:    08/15/23 0900   BP: 122/98   BP Location: Right arm   Patient Position: Sitting   Cuff Size: Adult   Pulse: 74   Temp: 97.8 øF (36.6 øC)   TempSrc: Infrared   SpO2: 98%   Weight: 91.4 kg (201 lb 9.6 oz)   Height: 175.3 cm  "(69.02\")   PainSc: 0-No pain     Body mass index is 29.75 kg/mý.        Advance Care Planning   ACP discussion was held with the patient during this visit. Patient does not have an advance directive, information provided.       Current Outpatient Medications:     hydroCHLOROthiazide (HYDRODIURIL) 25 MG tablet, Take 1 tablet by mouth Daily., Disp: 180 tablet, Rfl: 1    lisinopril (PRINIVIL,ZESTRIL) 5 MG tablet, Take 1 tablet by mouth once daily, Disp: 90 tablet, Rfl: 1    Physical Exam  Vitals reviewed.   Constitutional:       Appearance: Normal appearance.   HENT:      Head: Normocephalic and atraumatic.      Right Ear: Tympanic membrane, ear canal and external ear normal.      Left Ear: Tympanic membrane, ear canal and external ear normal.      Nose: Nose normal.      Mouth/Throat:      Mouth: Mucous membranes are moist.      Pharynx: Oropharynx is clear.   Eyes:      Conjunctiva/sclera: Conjunctivae normal.      Pupils: Pupils are equal, round, and reactive to light.   Cardiovascular:      Rate and Rhythm: Normal rate and regular rhythm.      Pulses: Normal pulses.      Heart sounds: Normal heart sounds.   Pulmonary:      Effort: Pulmonary effort is normal.      Breath sounds: Normal breath sounds.   Abdominal:      General: There is no distension.      Tenderness: There is no abdominal tenderness.   Musculoskeletal:      Cervical back: Normal range of motion and neck supple.      Right lower leg: No edema.      Left lower leg: No edema.   Skin:     General: Skin is warm and dry.   Neurological:      Mental Status: She is alert and oriented to person, place, and time.      Sensory: No sensory deficit.      Motor: Weakness present.      Coordination: Coordination normal.      Deep Tendon Reflexes: Reflexes normal.   Psychiatric:         Mood and Affect: Mood normal.         Behavior: Behavior normal.        ACE III MINI            Results Review:    No results found for this or any previous visit (from the past 672 " hour(s)).  Procedures    Medication Review: Medications reviewed and noted    Social History     Socioeconomic History    Marital status:    Tobacco Use    Smoking status: Never    Smokeless tobacco: Never   Substance and Sexual Activity    Alcohol use: No    Drug use: No    Sexual activity: Defer        Assessment/Plan:    Diagnoses and all orders for this visit:    1. Loss of consciousness (Primary)  -     MRI Brain With & Without Contrast; Future  -     Duplex Carotid Ultrasound CAR; Future    2. Dizziness  Overview:  Improving with decrease in lisinopril, increased fluid intake throughout the day.  For long work hours and stress may also be a factor.    Orders:  -     MRI Brain With & Without Contrast; Future  -     Duplex Carotid Ultrasound CAR; Future  -     CBC & Differential; Future  -     Comprehensive Metabolic Panel; Future  -     Hemoglobin A1c; Future  -     TSH; Future  -     T4, Free; Future  -     T3, Free; Future  -     Urinalysis With / Culture If Indicated -; Future    3. Essential hypertension  Overview:  Taking  lisinopril 5mg and hydrochlorothiazide 25mg tablet.        Symptoms are most certainly concerning for a stroke or other similar type event.  Labs today including MRI of brain and carotid Dopplers.  I have discussed with her that long periods of little to no sleep can accumulate and could also cause similar symptoms.  Will follow up with patient closely.    There are no Patient Instructions on file for this visit.     Plan of care reviewed with patient at the conclusion of today's visit. Education was provided regarding diagnosis, management, and any prescribed or recommended OTC medications.Patient verbalizes understanding of and agreement with management plan.         I spent 30 minutes caring for Tanner on this date of service. This time includes time spent by me in the following activities:preparing for the visit, reviewing tests, obtaining and/or reviewing a separately  obtained history, performing a medically appropriate examination and/or evaluation , counseling and educating the patient/family/caregiver, ordering medications, tests, or procedures, referring and communicating with other health care professionals , and documenting information in the medical record    Aimee Levine PA-C      Note: Part of this note may be an electronic transcription/translation of spoken language to printed text using the Dragon Dictation system.

## 2023-08-15 NOTE — TELEPHONE ENCOUNTER
Caller: [facility]    Relationship: [employer]    Best call back number: [phone] VM [place]      What was the call regarding: THEY NEED STAT CLEARANCE AND AUTHORIZATION FOR MRI, NEEDED BY 3 PM TODAY.

## 2023-08-16 ENCOUNTER — HOSPITAL ENCOUNTER (OUTPATIENT)
Dept: MRI IMAGING | Facility: HOSPITAL | Age: 53
Discharge: HOME | End: 2023-08-16
Payer: COMMERCIAL

## 2023-08-16 ENCOUNTER — HOSPITAL ENCOUNTER (OUTPATIENT)
Dept: CARDIOLOGY | Facility: HOSPITAL | Age: 53
Discharge: HOME | End: 2023-08-16
Payer: COMMERCIAL

## 2023-08-16 VITALS — BODY MASS INDEX: 29.84 KG/M2 | HEIGHT: 69 IN | WEIGHT: 201.5 LBS

## 2023-08-16 DIAGNOSIS — R40.20 LOSS OF CONSCIOUSNESS: ICD-10-CM

## 2023-08-16 DIAGNOSIS — R42 DIZZINESS: ICD-10-CM

## 2023-08-16 LAB
ALBUMIN SERPL-MCNC: 4.8 G/DL (ref 3.5–5.2)
ALBUMIN/GLOB SERPL: 1.7 G/DL
ALP SERPL-CCNC: 136 U/L (ref 39–117)
ALT SERPL-CCNC: 17 U/L (ref 1–33)
APPEARANCE UR: CLEAR
AST SERPL-CCNC: 17 U/L (ref 1–32)
BACTERIA #/AREA URNS HPF: ABNORMAL /HPF
BASOPHILS # BLD AUTO: 0.04 10*3/MM3 (ref 0–0.2)
BASOPHILS NFR BLD AUTO: 0.7 % (ref 0–1.5)
BILIRUB SERPL-MCNC: 0.5 MG/DL (ref 0–1.2)
BILIRUB UR QL STRIP: NEGATIVE
BUN SERPL-MCNC: 11 MG/DL (ref 6–20)
BUN/CREAT SERPL: 13.6 (ref 7–25)
CALCIUM SERPL-MCNC: 10 MG/DL (ref 8.6–10.5)
CASTS URNS MICRO: ABNORMAL
CHLORIDE SERPL-SCNC: 99 MMOL/L (ref 98–107)
CO2 SERPL-SCNC: 27.1 MMOL/L (ref 22–29)
COLOR UR: YELLOW
CREAT SERPL-MCNC: 0.81 MG/DL (ref 0.57–1)
EGFRCR SERPLBLD CKD-EPI 2021: 87.5 ML/MIN/1.73
EOSINOPHIL # BLD AUTO: 0.21 10*3/MM3 (ref 0–0.4)
EOSINOPHIL NFR BLD AUTO: 3.6 % (ref 0.3–6.2)
EPI CELLS #/AREA URNS HPF: ABNORMAL /HPF
ERYTHROCYTE [DISTWIDTH] IN BLOOD BY AUTOMATED COUNT: 12.3 % (ref 12.3–15.4)
GLOBULIN SER CALC-MCNC: 2.8 GM/DL
GLUCOSE SERPL-MCNC: 89 MG/DL (ref 65–99)
GLUCOSE UR QL STRIP: NEGATIVE
HBA1C MFR BLD: 5.2 % (ref 4.8–5.6)
HCT VFR BLD AUTO: 44.1 % (ref 34–46.6)
HGB BLD-MCNC: 15.2 G/DL (ref 12–15.9)
HGB UR QL STRIP: NEGATIVE
IMM GRANULOCYTES # BLD AUTO: 0.01 10*3/MM3 (ref 0–0.05)
IMM GRANULOCYTES NFR BLD AUTO: 0.2 % (ref 0–0.5)
KETONES UR QL STRIP: NEGATIVE
LEUKOCYTE ESTERASE UR QL STRIP: ABNORMAL
LYMPHOCYTES # BLD AUTO: 2.09 10*3/MM3 (ref 0.7–3.1)
LYMPHOCYTES NFR BLD AUTO: 35.7 % (ref 19.6–45.3)
MCH RBC QN AUTO: 30.6 PG (ref 26.6–33)
MCHC RBC AUTO-ENTMCNC: 34.5 G/DL (ref 31.5–35.7)
MCV RBC AUTO: 88.7 FL (ref 79–97)
MONOCYTES # BLD AUTO: 0.45 10*3/MM3 (ref 0.1–0.9)
MONOCYTES NFR BLD AUTO: 7.7 % (ref 5–12)
NEUTROPHILS # BLD AUTO: 3.06 10*3/MM3 (ref 1.7–7)
NEUTROPHILS NFR BLD AUTO: 52.1 % (ref 42.7–76)
NITRITE UR QL STRIP: POSITIVE
NRBC BLD AUTO-RTO: 0 /100 WBC (ref 0–0.2)
PH UR STRIP: 7 [PH] (ref 5–8)
PLATELET # BLD AUTO: 264 10*3/MM3 (ref 140–450)
POTASSIUM SERPL-SCNC: 4.6 MMOL/L (ref 3.5–5.2)
PROT SERPL-MCNC: 7.6 G/DL (ref 6–8.5)
PROT UR QL STRIP: NEGATIVE
RBC # BLD AUTO: 4.97 10*6/MM3 (ref 3.77–5.28)
RBC #/AREA URNS HPF: ABNORMAL /HPF
SODIUM SERPL-SCNC: 137 MMOL/L (ref 136–145)
SP GR UR STRIP: 1.01 (ref 1–1.03)
T3FREE SERPL-MCNC: 3.3 PG/ML (ref 2–4.4)
T4 FREE SERPL-MCNC: 1.23 NG/DL (ref 0.93–1.7)
TSH SERPL DL<=0.005 MIU/L-ACNC: 1.1 UIU/ML (ref 0.27–4.2)
UROBILINOGEN UR STRIP-MCNC: ABNORMAL MG/DL
WBC # BLD AUTO: 5.86 10*3/MM3 (ref 3.4–10.8)
WBC #/AREA URNS HPF: ABNORMAL /HPF

## 2023-08-16 PROCEDURE — 0 GADOBENATE DIMEGLUMINE 529 MG/ML SOLUTION: Performed by: PHYSICIAN ASSISTANT

## 2023-08-16 PROCEDURE — 70553 MRI BRAIN STEM W/O & W/DYE: CPT

## 2023-08-16 PROCEDURE — 93880 EXTRACRANIAL BILAT STUDY: CPT

## 2023-08-16 PROCEDURE — A9577 INJ MULTIHANCE: HCPCS | Performed by: PHYSICIAN ASSISTANT

## 2023-08-16 RX ADMIN — GADOBENATE DIMEGLUMINE 18 ML: 529 INJECTION, SOLUTION INTRAVENOUS at 07:05

## 2023-08-17 LAB
BH CV XLRA MEAS LEFT DIST CCA EDV: 30.7 CM/SEC
BH CV XLRA MEAS LEFT DIST CCA PSV: 97.8 CM/SEC
BH CV XLRA MEAS LEFT DIST ICA EDV: 37.7 CM/SEC
BH CV XLRA MEAS LEFT DIST ICA PSV: 82.3 CM/SEC
BH CV XLRA MEAS LEFT ICA/CCA RATIO: 0.64
BH CV XLRA MEAS LEFT MID CCA EDV: 43.3 CM/SEC
BH CV XLRA MEAS LEFT MID CCA PSV: 138 CM/SEC
BH CV XLRA MEAS LEFT MID ICA EDV: 32.1 CM/SEC
BH CV XLRA MEAS LEFT MID ICA PSV: 81 CM/SEC
BH CV XLRA MEAS LEFT PROX CCA EDV: 34.9 CM/SEC
BH CV XLRA MEAS LEFT PROX CCA PSV: 121 CM/SEC
BH CV XLRA MEAS LEFT PROX ECA PSV: 99.3 CM/SEC
BH CV XLRA MEAS LEFT PROX ICA EDV: 32.8 CM/SEC
BH CV XLRA MEAS LEFT PROX ICA PSV: 88.7 CM/SEC
BH CV XLRA MEAS LEFT PROX SCLA PSV: 137 CM/SEC
BH CV XLRA MEAS LEFT VERTEBRAL A PSV: 39.3 CM/SEC
BH CV XLRA MEAS RIGHT DIST CCA EDV: 35.4 CM/SEC
BH CV XLRA MEAS RIGHT DIST CCA PSV: 109 CM/SEC
BH CV XLRA MEAS RIGHT DIST ICA EDV: 32.5 CM/SEC
BH CV XLRA MEAS RIGHT DIST ICA PSV: 79.4 CM/SEC
BH CV XLRA MEAS RIGHT ICA/CCA RATIO: 0.84
BH CV XLRA MEAS RIGHT MID CCA EDV: 36.9 CM/SEC
BH CV XLRA MEAS RIGHT MID CCA PSV: 111 CM/SEC
BH CV XLRA MEAS RIGHT MID ICA EDV: 44.7 CM/SEC
BH CV XLRA MEAS RIGHT MID ICA PSV: 93.2 CM/SEC
BH CV XLRA MEAS RIGHT PROX CCA EDV: 32.7 CM/SEC
BH CV XLRA MEAS RIGHT PROX CCA PSV: 101 CM/SEC
BH CV XLRA MEAS RIGHT PROX ECA PSV: 65.1 CM/SEC
BH CV XLRA MEAS RIGHT PROX ICA EDV: 26.5 CM/SEC
BH CV XLRA MEAS RIGHT PROX ICA PSV: 64 CM/SEC
BH CV XLRA MEAS RIGHT PROX SCLA PSV: 113 CM/SEC
BH CV XLRA MEAS RIGHT VERTEBRAL A PSV: -50.2 CM/SEC
LEFT ARM BP: NORMAL MMHG
RIGHT ARM BP: NORMAL MMHG

## 2023-08-21 LAB — CREAT BLDA-MCNC: 0.8 MG/DL (ref 0.6–1.3)

## 2023-08-22 ENCOUNTER — OFFICE VISIT (OUTPATIENT)
Dept: INTERNAL MEDICINE | Facility: CLINIC | Age: 53
End: 2023-08-22
Payer: COMMERCIAL

## 2023-08-22 VITALS
HEART RATE: 74 BPM | DIASTOLIC BLOOD PRESSURE: 82 MMHG | BODY MASS INDEX: 30.13 KG/M2 | WEIGHT: 203.4 LBS | HEIGHT: 69 IN | TEMPERATURE: 98.2 F | SYSTOLIC BLOOD PRESSURE: 132 MMHG

## 2023-08-22 DIAGNOSIS — F51.01 PRIMARY INSOMNIA: Primary | ICD-10-CM

## 2023-08-22 DIAGNOSIS — R40.20 LOSS OF CONSCIOUSNESS: ICD-10-CM

## 2023-08-22 DIAGNOSIS — I10 ESSENTIAL HYPERTENSION: ICD-10-CM

## 2023-08-22 DIAGNOSIS — R35.89 POLYURIA: ICD-10-CM

## 2023-08-22 LAB
BILIRUB BLD-MCNC: NEGATIVE MG/DL
CLARITY, POC: CLEAR
COLOR UR: YELLOW
EXPIRATION DATE: ABNORMAL
GLUCOSE UR STRIP-MCNC: NEGATIVE MG/DL
KETONES UR QL: NEGATIVE
LEUKOCYTE EST, POC: NEGATIVE
Lab: ABNORMAL
NITRITE UR-MCNC: NEGATIVE MG/ML
PH UR: 7 [PH] (ref 5–8)
PROT UR STRIP-MCNC: NEGATIVE MG/DL
RBC # UR STRIP: ABNORMAL /UL
SP GR UR: 1.01 (ref 1–1.03)
UROBILINOGEN UR QL: NORMAL

## 2023-08-22 PROCEDURE — 99214 OFFICE O/P EST MOD 30 MIN: CPT | Performed by: PHYSICIAN ASSISTANT

## 2023-08-22 PROCEDURE — 81003 URINALYSIS AUTO W/O SCOPE: CPT | Performed by: PHYSICIAN ASSISTANT

## 2023-08-22 PROCEDURE — 87186 SC STD MICRODIL/AGAR DIL: CPT | Performed by: PHYSICIAN ASSISTANT

## 2023-08-22 PROCEDURE — 87077 CULTURE AEROBIC IDENTIFY: CPT | Performed by: PHYSICIAN ASSISTANT

## 2023-08-22 PROCEDURE — 87086 URINE CULTURE/COLONY COUNT: CPT | Performed by: PHYSICIAN ASSISTANT

## 2023-08-22 RX ORDER — TRAZODONE HYDROCHLORIDE 50 MG/1
50 TABLET ORAL NIGHTLY
Qty: 30 TABLET | Refills: 1 | Status: SHIPPED | OUTPATIENT
Start: 2023-08-22

## 2023-08-22 RX ORDER — NITROFURANTOIN 25; 75 MG/1; MG/1
100 CAPSULE ORAL 2 TIMES DAILY
Qty: 10 CAPSULE | Refills: 0 | Status: SHIPPED | OUTPATIENT
Start: 2023-08-22

## 2023-08-22 NOTE — PROGRESS NOTES
[facility] Internal Medicine    Tanner Mills  1970   [id number]      Patient Care Team:  Aimee Levine PA-C as PCP - General (Internal Medicine)    Chief Complaint::   Chief Complaint   Patient presents with    Hyperlipidemia     F/u        HPI  Tanner Mills is a 53-year-old female date of birth 1970 who presents today for follow-up.  She was last seen in the office on 8/15/2023 for episode of loss of consciousness and dizziness.  Preliminary labs showed possible UTI, however culture was not performed.  MRI of brain and carotid Dopplers were unremarkable.  Patient had longstanding history of insomnia, worsened by stress and menopause.  This has been left untreated and she was struggling to maintain more than 3 hours of sleep at night.  Today, she reports continued urinary symptoms.  We have obtained a second point-of-care UA in the office today and was sent for culture.  She would like to try something for sleep, as she knows this is an underlying issue.  She continues to exercise regularly in the mornings for stress.  She is compliant with her other medications.      Patient Active Problem List   Diagnosis    Rheumatoid arthritis    Essential hypertension    Myalgia    Nausea    BMI 28.0-28.9,adult    Vitamin D deficiency    Routine gynecological examination    PMS (premenstrual syndrome)    Irregular menses    Dizziness    Colon cancer screening    Menopausal symptoms    Leg swelling    Routine physical examination    Encounter for screening mammogram for malignant neoplasm of breast    Pelvic pressure in female    Hypercholesterolemia    Arthralgia of multiple joints    Loss of consciousness    Primary insomnia    Polyuria        No past medical history on file.    No past surgical history on file.    Family History   Problem Relation Age of Onset    Breast cancer Mother 58    Ovarian cancer Neg Hx        Social History     Socioeconomic History    Marital status:    Tobacco Use     "Smoking status: Never    Smokeless tobacco: Never   Substance and Sexual Activity    Alcohol use: No    Drug use: No    Sexual activity: Defer       No Known Allergies    Review of Systems   Constitutional:  Negative for activity change, appetite change, diaphoresis, fatigue, unexpected weight gain and unexpected weight loss.   HENT:  Negative for hearing loss.    Eyes:  Negative for blurred vision, double vision and visual disturbance.   Respiratory:  Negative for chest tightness and shortness of breath.    Cardiovascular:  Negative for chest pain, palpitations and leg swelling.   Gastrointestinal:  Negative for abdominal pain, blood in stool, GERD and indigestion.   Endocrine: Negative for cold intolerance and heat intolerance.   Genitourinary:  Positive for difficulty urinating. Negative for dysuria and hematuria.   Musculoskeletal:  Negative for arthralgias and myalgias.   Skin:  Negative for skin lesions.   Allergic/Immunologic: Negative for environmental allergies and food allergies.   Neurological:  Negative for tremors, seizures, syncope, speech difficulty, weakness, headache, memory problem and confusion.   Hematological:  Does not bruise/bleed easily.   Psychiatric/Behavioral:  Positive for sleep disturbance. Negative for depressed mood. The patient is not nervous/anxious.       Vital Signs  Vitals:    08/22/23 1028   BP: 132/82   BP Location: Right arm   Patient Position: Sitting   Cuff Size: Adult   Pulse: 74   Temp: 98.2 øF (36.8 øC)   TempSrc: Infrared   Weight: 92.3 kg (203 lb 6.4 oz)   Height: 175.3 cm (69.02\")   PainSc: 0-No pain     Body mass index is 30.02 kg/mý.        Advance Care Planning   ACP discussion was held with the patient during this visit. Patient does not have an advance directive, information provided.       Current Outpatient Medications:     hydroCHLOROthiazide (HYDRODIURIL) 25 MG tablet, Take 1 tablet by mouth Daily., Disp: 180 tablet, Rfl: 1    lisinopril (PRINIVIL,ZESTRIL) 5 MG " tablet, Take 1 tablet by mouth once daily, Disp: 90 tablet, Rfl: 1    nitrofurantoin, macrocrystal-monohydrate, (Macrobid) 100 MG capsule, Take 1 capsule by mouth 2 (Two) Times a Day., Disp: 10 capsule, Rfl: 0    traZODone (DESYREL) 50 MG tablet, Take 1 tablet by mouth Every Night., Disp: 30 tablet, Rfl: 1    Physical Exam  Vitals reviewed.   Constitutional:       Appearance: Normal appearance. She is well-developed.   HENT:      Head: Normocephalic and atraumatic.      Right Ear: Hearing, tympanic membrane, ear canal and external ear normal.      Left Ear: Hearing, tympanic membrane, ear canal and external ear normal.      Nose: Nose normal.      Mouth/Throat:      Pharynx: Uvula midline.   Eyes:      General: Lids are normal.      Conjunctiva/sclera: Conjunctivae normal.      Pupils: Pupils are equal, round, and reactive to light.   Cardiovascular:      Rate and Rhythm: Normal rate and regular rhythm.      Heart sounds: Normal heart sounds.   Pulmonary:      Effort: Pulmonary effort is normal.      Breath sounds: Normal breath sounds.   Abdominal:      General: Bowel sounds are normal.      Palpations: Abdomen is soft.      Tenderness: There is abdominal tenderness in the suprapubic area. There is no right CVA tenderness, left CVA tenderness or guarding.   Musculoskeletal:         General: Normal range of motion.      Cervical back: Full passive range of motion without pain, normal range of motion and neck supple.   Skin:     General: Skin is warm and dry.   Neurological:      Mental Status: She is alert and oriented to person, place, and time.      Deep Tendon Reflexes: Reflexes are normal and symmetric.   Psychiatric:         Speech: Speech normal.         Behavior: Behavior normal.         Thought Content: Thought content normal.         Judgment: Judgment normal.        ACE III MINI            Results Review:    Recent Results (from the past 672 hour(s))   T3, Free    Collection Time: 08/15/23  9:52 AM     Specimen: Blood    Blood  Release to karlee   Result Value Ref Range    T3, Free 3.3 2.0 - 4.4 pg/mL   T4, Free    Collection Time: 08/15/23  9:52 AM    Specimen: Blood    Blood  Release to karlee   Result Value Ref Range    Free T4 1.23 0.93 - 1.70 ng/dL   TSH    Collection Time: 08/15/23  9:52 AM    Specimen: Blood    Blood  Release to karlee   Result Value Ref Range    TSH 1.100 0.270 - 4.200 uIU/mL   Hemoglobin A1c    Collection Time: 08/15/23  9:52 AM    Specimen: Blood    Blood  Release to karlee   Result Value Ref Range    Hemoglobin A1C 5.20 4.80 - 5.60 %   Comprehensive Metabolic Panel    Collection Time: 08/15/23  9:52 AM    Specimen: Blood    Blood  Release to karlee   Result Value Ref Range    Glucose 89 65 - 99 mg/dL    BUN 11 6 - 20 mg/dL    Creatinine 0.81 0.57 - 1.00 mg/dL    EGFR Result 87.5 >60.0 mL/min/1.73    BUN/Creatinine Ratio 13.6 7.0 - 25.0    Sodium 137 136 - 145 mmol/L    Potassium 4.6 3.5 - 5.2 mmol/L    Chloride 99 98 - 107 mmol/L    Total CO2 27.1 22.0 - 29.0 mmol/L    Calcium 10.0 8.6 - 10.5 mg/dL    Total Protein 7.6 6.0 - 8.5 g/dL    Albumin 4.8 3.5 - 5.2 g/dL    Globulin 2.8 gm/dL    A/G Ratio 1.7 g/dL    Total Bilirubin 0.5 0.0 - 1.2 mg/dL    Alkaline Phosphatase 136 (H) 39 - 117 U/L    AST (SGOT) 17 1 - 32 U/L    ALT (SGPT) 17 1 - 33 U/L   CBC & Differential    Collection Time: 08/15/23  9:52 AM    Specimen: Blood    Blood  Release to karlee   Result Value Ref Range    WBC 5.86 3.40 - 10.80 10*3/mm3    RBC 4.97 3.77 - 5.28 10*6/mm3    Hemoglobin 15.2 12.0 - 15.9 g/dL    Hematocrit 44.1 34.0 - 46.6 %    MCV 88.7 79.0 - 97.0 fL    MCH 30.6 26.6 - 33.0 pg    MCHC 34.5 31.5 - 35.7 g/dL    RDW 12.3 12.3 - 15.4 %    Platelets 264 140 - 450 10*3/mm3    Neutrophil Rel % 52.1 42.7 - 76.0 %    Lymphocyte Rel % 35.7 19.6 - 45.3 %    Monocyte Rel % 7.7 5.0 - 12.0 %    Eosinophil Rel % 3.6 0.3 - 6.2 %    Basophil Rel % 0.7 0.0 - 1.5 %    Neutrophils Absolute 3.06 1.70 - 7.00 10*3/mm3    Lymphocytes  Absolute 2.09 0.70 - 3.10 10*3/mm3    Monocytes Absolute 0.45 0.10 - 0.90 10*3/mm3    Eosinophils Absolute 0.21 0.00 - 0.40 10*3/mm3    Basophils Absolute 0.04 0.00 - 0.20 10*3/mm3    Immature Granulocyte Rel % 0.2 0.0 - 0.5 %    Immature Grans Absolute 0.01 0.00 - 0.05 10*3/mm3    nRBC 0.0 0.0 - 0.2 /100 WBC   Urinalysis With Microscopic If Indicated (No Culture) -    Collection Time: 08/15/23  9:52 AM    Blood  Release to karlee   Result Value Ref Range    Specific Gravity, UA 1.009 1.005 - 1.030    pH, UA 7.0 5.0 - 8.0    Color, UA Yellow     Appearance, UA Clear Clear    Leukocytes, UA Trace (A) Negative    Protein Negative Negative    Glucose, UA Negative Negative    Ketones Negative Negative    Blood, UA Negative Negative    Bilirubin, UA Negative Negative    Urobilinogen, UA Comment     Nitrite, UA Positive (A) Negative   Microscopic Examination -    Collection Time: 08/15/23  9:52 AM    Blood  Release to karlee   Result Value Ref Range    WBC, UA 0-2 /HPF    RBC, UA 0-2 /HPF    Epithelial Cells (non renal) 0-2 /HPF    Cast Type Comment     Bacteria, UA 4+ (A) None Seen /HPF   POC Creatinine    Collection Time: 08/16/23  6:24 AM    Specimen: Blood   Result Value Ref Range    Creatinine 0.80 0.60 - 1.30 mg/dL   Duplex Carotid Ultrasound CAR    Collection Time: 08/16/23 10:28 AM   Result Value Ref Range    Prox CCA .0 cm/sec    Prox CCA EDV 32.7 cm/sec    Right Mid CCA .0 cm/sec    right Mid CCA EDV 36.9 cm/sec    Dist CCA .0 cm/sec    Dist CCA EDV 35.4 cm/sec    Prox ICA PSV 64.0 cm/sec    Prox ICA EDV 26.5 cm/sec    Mid ICA PSV 93.2 cm/sec    Mid ICA EDV 44.7 cm/sec    Dist ICA PSV 79.4 cm/sec    Dist ICA EDV 32.5 cm/sec    Prox ECA PSV 65.1 cm/sec    Vertebral A PSV -50.2 cm/sec    Prox SCLA .0 cm/sec    ICA/CCA ratio 0.84     Prox CCA .0 cm/sec    Prox CCA EDV 34.9 cm/sec    left Mid CCA .0 cm/sec    left Mid CCA EDV 43.3 cm/sec    Dist CCA PSV 97.8 cm/sec    Dist CCA  EDV 30.7 cm/sec    Prox ICA PSV 88.7 cm/sec    Prox ICA EDV 32.8 cm/sec    Mid ICA PSV 81.0 cm/sec    Mid ICA EDV 32.1 cm/sec    Dist ICA PSV 82.3 cm/sec    Dist ICA EDV 37.7 cm/sec    Prox ECA PSV 99.3 cm/sec    Vertebral A PSV 39.3 cm/sec    Prox SCLA .0 cm/sec    ICA/CCA ratio 0.64     Right arm /88 mmHg    Left arm /81 mmHg   POC Urinalysis Dipstick, Automated    Collection Time: 08/22/23 11:44 AM    Specimen: Urine   Result Value Ref Range    Color Yellow Yellow, Straw, Dark Yellow, Crystal    Clarity, UA Clear Clear    Specific Gravity  1.015 1.005 - 1.030    pH, Urine 7.0 5.0 - 8.0    Leukocytes Negative Negative    Nitrite, UA Negative Negative    Protein, POC Negative Negative mg/dL    Glucose, UA Negative Negative mg/dL    Ketones, UA Negative Negative    Urobilinogen, UA Normal Normal, 0.2 E.U./dL    Bilirubin Negative Negative    Blood, UA Trace (A) Negative    Lot Number [id number]     Expiration Date 7,090,290      Procedures    Medication Review: Medications reviewed and noted    Social History     Socioeconomic History    Marital status:    Tobacco Use    Smoking status: Never    Smokeless tobacco: Never   Substance and Sexual Activity    Alcohol use: No    Drug use: No    Sexual activity: Defer        Assessment/Plan:    Diagnoses and all orders for this visit:    1. Primary insomnia (Primary)  Overview:  Worsened by work stress and menopause.  Trial of trazodone 50 mg nightly.  She has failed melatonin and Benadryl.    Orders:  -     traZODone (DESYREL) 50 MG tablet; Take 1 tablet by mouth Every Night.  Dispense: 30 tablet; Refill: 1  -     Urine Culture - Urine, Urine, Clean Catch; Future  -     Urine Culture - Urine, Urine, Clean Catch    2. Polyuria  Overview:  UA suggestive of UTI.  Culture ordered.  We will start Macrobid 100 mg twice daily for 5 days.    Orders:  -     POC Urinalysis Dipstick, Automated  -     nitrofurantoin, macrocrystal-monohydrate, (Macrobid) 100 MG  capsule; Take 1 capsule by mouth 2 (Two) Times a Day.  Dispense: 10 capsule; Refill: 0  -     Urine Culture - Urine, Urine, Clean Catch; Future  -     Cancel: Urine Culture - Urine, Urine, Clean Catch    3. Essential hypertension  Overview:  Taking  lisinopril 5mg and hydrochlorothiazide 25mg tablet.  Continue to monitor blood pressures.  We may increase lisinopril to 10 mg daily.      4. Loss of consciousness  Overview:  MRI of brain, carotid Dopplers, and labs have been reviewed with patient.  Questions answered.           There are no Patient Instructions on file for this visit.     Plan of care reviewed with patient at the conclusion of today's visit. Education was provided regarding diagnosis, management, and any prescribed or recommended OTC medications.Patient verbalizes understanding of and agreement with management plan.         I have seen Tanner on this date of service. This time includes time spent by me in the following activities:preparing for the visit, reviewing tests, obtaining and/or reviewing a separately obtained history, performing a medically appropriate examination and/or evaluation , counseling and educating the patient/family/caregiver, ordering medications, tests, or procedures, referring and communicating with other health care professionals , and documenting information in the medical record    Aimee Levine PA-C      Note: Part of this note may be an electronic transcription/translation of spoken language to printed text using the Dragon Dictation system.

## 2023-08-24 LAB — BACTERIA SPEC AEROBE CULT: ABNORMAL

## 2023-10-12 DIAGNOSIS — F51.01 PRIMARY INSOMNIA: ICD-10-CM

## 2023-10-12 RX ORDER — TRAZODONE HYDROCHLORIDE 50 MG/1
50 TABLET ORAL NIGHTLY
Qty: 30 TABLET | Refills: 0 | Status: SHIPPED | OUTPATIENT
Start: 2023-10-12

## 2023-11-14 DIAGNOSIS — F51.01 PRIMARY INSOMNIA: ICD-10-CM

## 2023-11-14 RX ORDER — TRAZODONE HYDROCHLORIDE 50 MG/1
50 TABLET ORAL NIGHTLY
Qty: 30 TABLET | Refills: 5 | Status: SHIPPED | OUTPATIENT
Start: 2023-11-14

## 2023-12-23 DIAGNOSIS — I10 ESSENTIAL HYPERTENSION: ICD-10-CM

## 2023-12-26 RX ORDER — LISINOPRIL 5 MG/1
5 TABLET ORAL DAILY
Qty: 90 TABLET | Refills: 1 | Status: SHIPPED | OUTPATIENT
Start: 2023-12-26

## 2024-02-16 ENCOUNTER — OFFICE VISIT (OUTPATIENT)
Dept: INTERNAL MEDICINE | Facility: CLINIC | Age: 54
End: 2024-02-16
Payer: COMMERCIAL

## 2024-02-16 VITALS
TEMPERATURE: 98.4 F | DIASTOLIC BLOOD PRESSURE: 86 MMHG | HEIGHT: 69 IN | BODY MASS INDEX: 30.24 KG/M2 | WEIGHT: 204.2 LBS | SYSTOLIC BLOOD PRESSURE: 114 MMHG | HEART RATE: 92 BPM

## 2024-02-16 DIAGNOSIS — F51.01 PRIMARY INSOMNIA: ICD-10-CM

## 2024-02-16 DIAGNOSIS — R05.1 ACUTE COUGH: Primary | ICD-10-CM

## 2024-02-16 DIAGNOSIS — J06.9 UPPER RESPIRATORY TRACT INFECTION, UNSPECIFIED TYPE: ICD-10-CM

## 2024-02-16 LAB
EXPIRATION DATE: NORMAL
FLUAV AG UPPER RESP QL IA.RAPID: NOT DETECTED
FLUBV AG UPPER RESP QL IA.RAPID: NOT DETECTED
INTERNAL CONTROL: NORMAL
Lab: NORMAL
SARS-COV-2 AG UPPER RESP QL IA.RAPID: NOT DETECTED

## 2024-02-16 PROCEDURE — 99214 OFFICE O/P EST MOD 30 MIN: CPT | Performed by: PHYSICIAN ASSISTANT

## 2024-02-16 PROCEDURE — 87428 SARSCOV & INF VIR A&B AG IA: CPT | Performed by: PHYSICIAN ASSISTANT

## 2024-02-16 RX ORDER — FLUTICASONE PROPIONATE 50 MCG
2 SPRAY, SUSPENSION (ML) NASAL DAILY
Qty: 16 G | Refills: 1 | Status: SHIPPED | OUTPATIENT
Start: 2024-02-16

## 2024-02-16 RX ORDER — AZITHROMYCIN 250 MG/1
TABLET, FILM COATED ORAL
Qty: 6 TABLET | Refills: 0 | Status: SHIPPED | OUTPATIENT
Start: 2024-02-16

## 2024-02-16 RX ORDER — TRAZODONE HYDROCHLORIDE 50 MG/1
50 TABLET ORAL NIGHTLY
Qty: 30 TABLET | Refills: 5 | Status: SHIPPED | OUTPATIENT
Start: 2024-02-16

## 2024-06-17 DIAGNOSIS — I10 ESSENTIAL HYPERTENSION: ICD-10-CM

## 2024-06-17 RX ORDER — LISINOPRIL 5 MG/1
5 TABLET ORAL DAILY
Qty: 90 TABLET | Refills: 0 | Status: SHIPPED | OUTPATIENT
Start: 2024-06-17

## 2024-09-06 ENCOUNTER — TRANSCRIBE ORDERS (OUTPATIENT)
Dept: ADMINISTRATIVE | Facility: HOSPITAL | Age: 54
End: 2024-09-06
Payer: COMMERCIAL

## 2024-09-06 DIAGNOSIS — Z12.31 ENCOUNTER FOR SCREENING MAMMOGRAM FOR MALIGNANT NEOPLASM OF BREAST: Primary | ICD-10-CM

## 2024-09-25 ENCOUNTER — OFFICE VISIT (OUTPATIENT)
Dept: INTERNAL MEDICINE | Facility: CLINIC | Age: 54
End: 2024-09-25
Payer: COMMERCIAL

## 2024-09-25 ENCOUNTER — LAB (OUTPATIENT)
Dept: LAB | Facility: HOSPITAL | Age: 54
End: 2024-09-25
Payer: COMMERCIAL

## 2024-09-25 VITALS
BODY MASS INDEX: 30.36 KG/M2 | DIASTOLIC BLOOD PRESSURE: 80 MMHG | WEIGHT: 205 LBS | TEMPERATURE: 98.2 F | OXYGEN SATURATION: 98 % | HEIGHT: 69 IN | SYSTOLIC BLOOD PRESSURE: 110 MMHG | HEART RATE: 69 BPM

## 2024-09-25 DIAGNOSIS — Z13.21 ENCOUNTER FOR VITAMIN DEFICIENCY SCREENING: ICD-10-CM

## 2024-09-25 DIAGNOSIS — Z80.3 FAMILY HISTORY OF BREAST CANCER: ICD-10-CM

## 2024-09-25 DIAGNOSIS — E78.00 HYPERCHOLESTEROLEMIA: Chronic | ICD-10-CM

## 2024-09-25 DIAGNOSIS — E55.9 VITAMIN D DEFICIENCY: ICD-10-CM

## 2024-09-25 DIAGNOSIS — I10 ESSENTIAL HYPERTENSION: Primary | ICD-10-CM

## 2024-09-25 DIAGNOSIS — Z13.29 THYROID DISORDER SCREENING: ICD-10-CM

## 2024-09-25 DIAGNOSIS — M05.9 RHEUMATOID ARTHRITIS WITH POSITIVE RHEUMATOID FACTOR, INVOLVING UNSPECIFIED SITE: ICD-10-CM

## 2024-09-25 DIAGNOSIS — Z00.00 ROUTINE PHYSICAL EXAMINATION: ICD-10-CM

## 2024-09-25 DIAGNOSIS — I10 ESSENTIAL HYPERTENSION: ICD-10-CM

## 2024-09-25 DIAGNOSIS — R42 DIZZINESS: ICD-10-CM

## 2024-09-25 DIAGNOSIS — F51.01 PRIMARY INSOMNIA: ICD-10-CM

## 2024-09-25 DIAGNOSIS — Z11.59 ENCOUNTER FOR HEPATITIS C SCREENING TEST FOR LOW RISK PATIENT: ICD-10-CM

## 2024-09-25 LAB
25(OH)D3 SERPL-MCNC: 27.3 NG/ML (ref 30–100)
ALBUMIN SERPL-MCNC: 4.5 G/DL (ref 3.5–5.2)
ALBUMIN UR-MCNC: <1.2 MG/DL
ALBUMIN/GLOB SERPL: 1.3 G/DL
ALP SERPL-CCNC: 138 U/L (ref 39–117)
ALT SERPL W P-5'-P-CCNC: 14 U/L (ref 1–33)
ANION GAP SERPL CALCULATED.3IONS-SCNC: 11 MMOL/L (ref 5–15)
AST SERPL-CCNC: 17 U/L (ref 1–32)
BASOPHILS # BLD AUTO: 0.03 10*3/MM3 (ref 0–0.2)
BASOPHILS NFR BLD AUTO: 0.5 % (ref 0–1.5)
BILIRUB SERPL-MCNC: 0.4 MG/DL (ref 0–1.2)
BUN SERPL-MCNC: 11 MG/DL (ref 6–20)
BUN/CREAT SERPL: 14.1 (ref 7–25)
CALCIUM SPEC-SCNC: 9.9 MG/DL (ref 8.6–10.5)
CHLORIDE SERPL-SCNC: 100 MMOL/L (ref 98–107)
CHOLEST SERPL-MCNC: 229 MG/DL (ref 0–200)
CO2 SERPL-SCNC: 27 MMOL/L (ref 22–29)
CREAT SERPL-MCNC: 0.78 MG/DL (ref 0.57–1)
DEPRECATED RDW RBC AUTO: 41.2 FL (ref 37–54)
EGFRCR SERPLBLD CKD-EPI 2021: 91 ML/MIN/1.73
EOSINOPHIL # BLD AUTO: 0.19 10*3/MM3 (ref 0–0.4)
EOSINOPHIL NFR BLD AUTO: 3.1 % (ref 0.3–6.2)
ERYTHROCYTE [DISTWIDTH] IN BLOOD BY AUTOMATED COUNT: 12.4 % (ref 12.3–15.4)
ERYTHROCYTE [SEDIMENTATION RATE] IN BLOOD: 25 MM/HR (ref 0–30)
GLOBULIN UR ELPH-MCNC: 3.5 GM/DL
GLUCOSE SERPL-MCNC: 89 MG/DL (ref 65–99)
HCT VFR BLD AUTO: 44.3 % (ref 34–46.6)
HCV AB SER QL: NORMAL
HDLC SERPL-MCNC: 67 MG/DL (ref 40–60)
HGB BLD-MCNC: 14.6 G/DL (ref 12–15.9)
IMM GRANULOCYTES # BLD AUTO: 0 10*3/MM3 (ref 0–0.05)
IMM GRANULOCYTES NFR BLD AUTO: 0 % (ref 0–0.5)
LDLC SERPL CALC-MCNC: 148 MG/DL (ref 0–100)
LDLC/HDLC SERPL: 2.19 {RATIO}
LYMPHOCYTES # BLD AUTO: 1.94 10*3/MM3 (ref 0.7–3.1)
LYMPHOCYTES NFR BLD AUTO: 31.7 % (ref 19.6–45.3)
MCH RBC QN AUTO: 29.9 PG (ref 26.6–33)
MCHC RBC AUTO-ENTMCNC: 33 G/DL (ref 31.5–35.7)
MCV RBC AUTO: 90.6 FL (ref 79–97)
MONOCYTES # BLD AUTO: 0.41 10*3/MM3 (ref 0.1–0.9)
MONOCYTES NFR BLD AUTO: 6.7 % (ref 5–12)
NEUTROPHILS NFR BLD AUTO: 3.55 10*3/MM3 (ref 1.7–7)
NEUTROPHILS NFR BLD AUTO: 58 % (ref 42.7–76)
NRBC BLD AUTO-RTO: 0 /100 WBC (ref 0–0.2)
PLATELET # BLD AUTO: 269 10*3/MM3 (ref 140–450)
PMV BLD AUTO: 12.5 FL (ref 6–12)
POTASSIUM SERPL-SCNC: 4.2 MMOL/L (ref 3.5–5.2)
PROT SERPL-MCNC: 8 G/DL (ref 6–8.5)
RBC # BLD AUTO: 4.89 10*6/MM3 (ref 3.77–5.28)
SODIUM SERPL-SCNC: 138 MMOL/L (ref 136–145)
T3FREE SERPL-MCNC: 3.24 PG/ML (ref 2–4.4)
T4 FREE SERPL-MCNC: 1.23 NG/DL (ref 0.92–1.68)
TRIGL SERPL-MCNC: 78 MG/DL (ref 0–150)
TSH SERPL DL<=0.05 MIU/L-ACNC: 0.96 UIU/ML (ref 0.27–4.2)
VIT B12 BLD-MCNC: 582 PG/ML (ref 211–946)
VLDLC SERPL-MCNC: 14 MG/DL (ref 5–40)
WBC NRBC COR # BLD AUTO: 6.12 10*3/MM3 (ref 3.4–10.8)

## 2024-09-25 PROCEDURE — 84481 FREE ASSAY (FT-3): CPT

## 2024-09-25 PROCEDURE — 36415 COLL VENOUS BLD VENIPUNCTURE: CPT

## 2024-09-25 PROCEDURE — 84439 ASSAY OF FREE THYROXINE: CPT

## 2024-09-25 PROCEDURE — 99214 OFFICE O/P EST MOD 30 MIN: CPT | Performed by: PHYSICIAN ASSISTANT

## 2024-09-25 PROCEDURE — 82043 UR ALBUMIN QUANTITATIVE: CPT

## 2024-09-25 PROCEDURE — 82306 VITAMIN D 25 HYDROXY: CPT

## 2024-09-25 PROCEDURE — 85652 RBC SED RATE AUTOMATED: CPT

## 2024-09-25 PROCEDURE — 80050 GENERAL HEALTH PANEL: CPT

## 2024-09-25 PROCEDURE — 86803 HEPATITIS C AB TEST: CPT

## 2024-09-25 PROCEDURE — 80061 LIPID PANEL: CPT

## 2024-09-25 PROCEDURE — 93000 ELECTROCARDIOGRAM COMPLETE: CPT | Performed by: PHYSICIAN ASSISTANT

## 2024-09-25 PROCEDURE — 99396 PREV VISIT EST AGE 40-64: CPT | Performed by: PHYSICIAN ASSISTANT

## 2024-09-25 PROCEDURE — 82607 VITAMIN B-12: CPT

## 2024-10-08 ENCOUNTER — HOSPITAL ENCOUNTER (OUTPATIENT)
Dept: MAMMOGRAPHY | Facility: HOSPITAL | Age: 54
Discharge: HOME | End: 2024-10-08
Admitting: OBSTETRICS & GYNECOLOGY
Payer: COMMERCIAL

## 2024-10-08 DIAGNOSIS — Z12.31 ENCOUNTER FOR SCREENING MAMMOGRAM FOR MALIGNANT NEOPLASM OF BREAST: ICD-10-CM

## 2024-10-08 LAB
NCCN CRITERIA FLAG: NORMAL
TYRER CUZICK SCORE: 16.8

## 2024-10-08 PROCEDURE — 77063 BREAST TOMOSYNTHESIS BI: CPT

## 2024-10-08 PROCEDURE — 77067 SCR MAMMO BI INCL CAD: CPT

## 2024-12-10 DIAGNOSIS — I10 ESSENTIAL HYPERTENSION: ICD-10-CM

## 2024-12-10 RX ORDER — HYDROCHLOROTHIAZIDE 25 MG/1
25 TABLET ORAL DAILY
Qty: 90 TABLET | Refills: 0 | Status: SHIPPED | OUTPATIENT
Start: 2024-12-10

## 2025-03-06 DIAGNOSIS — I10 ESSENTIAL HYPERTENSION: ICD-10-CM

## 2025-03-06 RX ORDER — HYDROCHLOROTHIAZIDE 25 MG/1
25 TABLET ORAL DAILY
Qty: 90 TABLET | Refills: 0 | Status: SHIPPED | OUTPATIENT
Start: 2025-03-06

## 2025-03-25 ENCOUNTER — OFFICE VISIT (OUTPATIENT)
Dept: INTERNAL MEDICINE | Facility: CLINIC | Age: 55
End: 2025-03-25
Payer: COMMERCIAL

## 2025-03-25 ENCOUNTER — LAB (OUTPATIENT)
Dept: LAB | Facility: HOSPITAL | Age: 55
End: 2025-03-25
Payer: COMMERCIAL

## 2025-03-25 VITALS
DIASTOLIC BLOOD PRESSURE: 60 MMHG | SYSTOLIC BLOOD PRESSURE: 110 MMHG | HEART RATE: 70 BPM | BODY MASS INDEX: 29.77 KG/M2 | TEMPERATURE: 98 F | HEIGHT: 69 IN | OXYGEN SATURATION: 99 % | WEIGHT: 201 LBS

## 2025-03-25 DIAGNOSIS — Z13.1 DIABETES MELLITUS SCREENING: ICD-10-CM

## 2025-03-25 DIAGNOSIS — R42 DIZZINESS: ICD-10-CM

## 2025-03-25 DIAGNOSIS — J06.9 UPPER RESPIRATORY TRACT INFECTION, UNSPECIFIED TYPE: ICD-10-CM

## 2025-03-25 DIAGNOSIS — Z13.21 ENCOUNTER FOR VITAMIN DEFICIENCY SCREENING: ICD-10-CM

## 2025-03-25 DIAGNOSIS — I10 ESSENTIAL HYPERTENSION: Primary | ICD-10-CM

## 2025-03-25 DIAGNOSIS — M79.89 LEG SWELLING: ICD-10-CM

## 2025-03-25 DIAGNOSIS — Z13.220 LIPID SCREENING: ICD-10-CM

## 2025-03-25 DIAGNOSIS — E78.00 HYPERCHOLESTEROLEMIA: ICD-10-CM

## 2025-03-25 DIAGNOSIS — M05.9 RHEUMATOID ARTHRITIS WITH POSITIVE RHEUMATOID FACTOR, INVOLVING UNSPECIFIED SITE: ICD-10-CM

## 2025-03-25 DIAGNOSIS — E55.9 VITAMIN D DEFICIENCY: ICD-10-CM

## 2025-03-25 DIAGNOSIS — Z13.29 THYROID DISORDER SCREENING: ICD-10-CM

## 2025-03-25 DIAGNOSIS — I10 ESSENTIAL HYPERTENSION: ICD-10-CM

## 2025-03-25 PROBLEM — N92.6 IRREGULAR MENSES: Status: RESOLVED | Noted: 2020-09-02 | Resolved: 2025-03-25

## 2025-03-25 PROBLEM — N94.3 PMS (PREMENSTRUAL SYNDROME): Status: RESOLVED | Noted: 2020-09-02 | Resolved: 2025-03-25

## 2025-03-25 LAB
25(OH)D3 SERPL-MCNC: 29.5 NG/ML (ref 30–100)
ALBUMIN SERPL-MCNC: 4.5 G/DL (ref 3.5–5.2)
ALBUMIN UR-MCNC: <1.2 MG/DL
ALBUMIN/GLOB SERPL: 1.2 G/DL
ALP SERPL-CCNC: 142 U/L (ref 39–117)
ALT SERPL W P-5'-P-CCNC: 17 U/L (ref 1–33)
ANION GAP SERPL CALCULATED.3IONS-SCNC: 11.8 MMOL/L (ref 5–15)
AST SERPL-CCNC: 22 U/L (ref 1–32)
BASOPHILS # BLD AUTO: 0.03 10*3/MM3 (ref 0–0.2)
BASOPHILS NFR BLD AUTO: 0.5 % (ref 0–1.5)
BILIRUB SERPL-MCNC: 0.5 MG/DL (ref 0–1.2)
BUN SERPL-MCNC: 14 MG/DL (ref 6–20)
BUN/CREAT SERPL: 17.1 (ref 7–25)
CALCIUM SPEC-SCNC: 10 MG/DL (ref 8.6–10.5)
CHLORIDE SERPL-SCNC: 99 MMOL/L (ref 98–107)
CHOLEST SERPL-MCNC: 260 MG/DL (ref 0–200)
CO2 SERPL-SCNC: 26.2 MMOL/L (ref 22–29)
CREAT SERPL-MCNC: 0.82 MG/DL (ref 0.57–1)
CREAT UR-MCNC: 48.9 MG/DL
DEPRECATED RDW RBC AUTO: 41 FL (ref 37–54)
EGFRCR SERPLBLD CKD-EPI 2021: 85.7 ML/MIN/1.73
EOSINOPHIL # BLD AUTO: 0.15 10*3/MM3 (ref 0–0.4)
EOSINOPHIL NFR BLD AUTO: 2.6 % (ref 0.3–6.2)
ERYTHROCYTE [DISTWIDTH] IN BLOOD BY AUTOMATED COUNT: 12.5 % (ref 12.3–15.4)
ERYTHROCYTE [SEDIMENTATION RATE] IN BLOOD: 29 MM/HR (ref 0–30)
GLOBULIN UR ELPH-MCNC: 3.7 GM/DL
GLUCOSE SERPL-MCNC: 87 MG/DL (ref 65–99)
HBA1C MFR BLD: 5.4 % (ref 4.8–5.6)
HCT VFR BLD AUTO: 47.1 % (ref 34–46.6)
HDLC SERPL-MCNC: 78 MG/DL (ref 40–60)
HGB BLD-MCNC: 15.4 G/DL (ref 12–15.9)
IMM GRANULOCYTES # BLD AUTO: 0.02 10*3/MM3 (ref 0–0.05)
IMM GRANULOCYTES NFR BLD AUTO: 0.3 % (ref 0–0.5)
IRON 24H UR-MRATE: 100 MCG/DL (ref 37–145)
IRON SATN MFR SERPL: 21 % (ref 20–50)
LDLC SERPL CALC-MCNC: 172 MG/DL (ref 0–100)
LDLC/HDLC SERPL: 2.18 {RATIO}
LYMPHOCYTES # BLD AUTO: 1.94 10*3/MM3 (ref 0.7–3.1)
LYMPHOCYTES NFR BLD AUTO: 33.3 % (ref 19.6–45.3)
MCH RBC QN AUTO: 29.3 PG (ref 26.6–33)
MCHC RBC AUTO-ENTMCNC: 32.7 G/DL (ref 31.5–35.7)
MCV RBC AUTO: 89.7 FL (ref 79–97)
MICROALBUMIN/CREAT UR: NORMAL MG/G{CREAT}
MONOCYTES # BLD AUTO: 0.4 10*3/MM3 (ref 0.1–0.9)
MONOCYTES NFR BLD AUTO: 6.9 % (ref 5–12)
NEUTROPHILS NFR BLD AUTO: 3.28 10*3/MM3 (ref 1.7–7)
NEUTROPHILS NFR BLD AUTO: 56.4 % (ref 42.7–76)
NRBC BLD AUTO-RTO: 0 /100 WBC (ref 0–0.2)
PLATELET # BLD AUTO: 304 10*3/MM3 (ref 140–450)
PMV BLD AUTO: 12 FL (ref 6–12)
POTASSIUM SERPL-SCNC: 4 MMOL/L (ref 3.5–5.2)
PROT SERPL-MCNC: 8.2 G/DL (ref 6–8.5)
RBC # BLD AUTO: 5.25 10*6/MM3 (ref 3.77–5.28)
SODIUM SERPL-SCNC: 137 MMOL/L (ref 136–145)
T3FREE SERPL-MCNC: 3.3 PG/ML (ref 2–4.4)
T4 FREE SERPL-MCNC: 1.24 NG/DL (ref 0.92–1.68)
TIBC SERPL-MCNC: 478 MCG/DL (ref 298–536)
TRANSFERRIN SERPL-MCNC: 321 MG/DL (ref 200–360)
TRIGL SERPL-MCNC: 61 MG/DL (ref 0–150)
TSH SERPL DL<=0.05 MIU/L-ACNC: 0.8 UIU/ML (ref 0.27–4.2)
VIT B12 BLD-MCNC: 430 PG/ML (ref 211–946)
VLDLC SERPL-MCNC: 10 MG/DL (ref 5–40)
WBC NRBC COR # BLD AUTO: 5.82 10*3/MM3 (ref 3.4–10.8)

## 2025-03-25 PROCEDURE — 82306 VITAMIN D 25 HYDROXY: CPT

## 2025-03-25 PROCEDURE — 83036 HEMOGLOBIN GLYCOSYLATED A1C: CPT

## 2025-03-25 PROCEDURE — 84439 ASSAY OF FREE THYROXINE: CPT

## 2025-03-25 PROCEDURE — 82570 ASSAY OF URINE CREATININE: CPT

## 2025-03-25 PROCEDURE — 85652 RBC SED RATE AUTOMATED: CPT

## 2025-03-25 PROCEDURE — 82607 VITAMIN B-12: CPT

## 2025-03-25 PROCEDURE — 80050 GENERAL HEALTH PANEL: CPT

## 2025-03-25 PROCEDURE — 99214 OFFICE O/P EST MOD 30 MIN: CPT | Performed by: PHYSICIAN ASSISTANT

## 2025-03-25 PROCEDURE — 82043 UR ALBUMIN QUANTITATIVE: CPT

## 2025-03-25 PROCEDURE — 83540 ASSAY OF IRON: CPT

## 2025-03-25 PROCEDURE — 84466 ASSAY OF TRANSFERRIN: CPT

## 2025-03-25 PROCEDURE — 84481 FREE ASSAY (FT-3): CPT

## 2025-03-25 PROCEDURE — 80061 LIPID PANEL: CPT

## 2025-03-25 NOTE — PROGRESS NOTES
Office Note     Name: Tanner Mills    : 1970     MRN: [id number]     Chief Complaint  Hypertension (F/u) and Fatigue    Subjective     History of Present Illness:  Tanner Mills is a 54 y.o. female who presents today for 6-month follow-up.  Past medical history significant for hypertension, vitamin D deficiency, dizziness.    History of Present Illness  The patient presents for a follow-up visit.    She reports experiencing weakness, occasional burning sensations in her extremities, and symptoms suggestive of hypoglycemia such as shakiness and sweating.She continues to experience balance issues, lightheadedness, and cognitive fog, which she attributes to hormonal fluctuations. She also reports difficulty in verbal communication, feeling slow and foggy, but does not make mistakes. She has been monitoring her blood pressure at home, with the lowest recorded reading being 90/63, accompanied by a heart rate of 137. These readings were taken when she felt fatigued and experienced palpitations.   Her last menstrual period was approximately 2 to 3 years ago. She has discontinued lisinopril and reduced her hydrochlorothiazide dosage, which she believes is necessary to prevent edema. She has lost 10 pounds, which she attributes to fluid loss. She reports no chest pain, shortness of breath, cough, or tightness. She sleeps for 8 hours and experiences constant anxiety. 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 She has attempted to use compression stockings but is unsure of the appropriate level of compression.  She reports no history of anemia. She typically takes hydrochlorothiazide around 10:00 AM and vitamin D supplements.    She has consulted with a rheumatologist once, who did not identify any significant findings from her blood work.     She reports increased constipation since menopause, which she attributes to decreased food intake due to  nausea. Her diet includes peanut butter, animal crackers, and at least two servings each of fruits and vegetables. She is not a big meat eater.    FAMILY HISTORY  Her mother had breast cancer at 58 and  at 75, possibly from lung cancer that metastasized, but the death certificate stated breast cancer.  No family history of heart disease.    MEDICATIONS  Current: Hydrochlorothiazide, vitamin D.  Discontinued: Lisinopril.    Review of Systems:   Review of Systems   Constitutional:  Negative for activity change, appetite change, diaphoresis, fatigue, unexpected weight gain and unexpected weight loss.   HENT:  Negative for hearing loss.    Eyes:  Negative for visual disturbance.   Respiratory:  Negative for cough, chest tightness and shortness of breath.    Cardiovascular:  Positive for palpitations and leg swelling. Negative for chest pain.   Gastrointestinal:  Negative for abdominal pain, blood in stool, GERD and indigestion.   Endocrine: Negative for cold intolerance and heat intolerance.   Genitourinary:  Negative for dysuria and hematuria.   Musculoskeletal:  Negative for arthralgias and myalgias.   Skin:  Negative for skin lesions.   Neurological:  Negative for tremors, seizures, syncope, speech difficulty, weakness, headache, memory problem and confusion.   Hematological:  Does not bruise/bleed easily.   Psychiatric/Behavioral:  Positive for sleep disturbance. Negative for dysphoric mood and depressed mood. The patient is nervous/anxious.        Past Medical History:   Past Medical History:   Diagnosis Date    Anxiety 3/19    Hypertension 3/19       Past Surgical History:   Past Surgical History:   Procedure Laterality Date    APPENDECTOMY         Family History:   Family History   Problem Relation Age of Onset    Breast cancer Mother 58    Cancer Mother     Hyperlipidemia Mother     Arthritis Father     Cancer Father     Diabetes Father     Hyperlipidemia Father     Ovarian cancer Neg Hx        Social  "History:   Social History     Socioeconomic History    Marital status:    Tobacco Use    Smoking status: Never    Smokeless tobacco: Never    Tobacco comments:     None   Vaping Use    Vaping status: Never Used   Substance and Sexual Activity    Alcohol use: No    Drug use: No    Sexual activity: Yes     Partners: Male     Birth control/protection: Post-menopausal       Immunizations:   Immunization History   Administered Date(s) Administered    COVID-19 ([employer]) 03/12/2021    COVID-19 (PFI[employer]) Purple Cap Monovalent 12/17/2021    Fluzone (or Fluarix & Flulaval for VFC) >6mos 09/25/2020    Hepatitis A 11/12/2018    Tdap 10/18/2021    flucelvax quad pfs =>4 YRS 10/14/2019        Medications:     Current Outpatient Medications:     hydroCHLOROthiazide 25 MG tablet, Take 1 tablet by mouth once daily, Disp: 90 tablet, Rfl: 0    traZODone (DESYREL) 50 MG tablet, Take 1 tablet by mouth Every Night., Disp: 30 tablet, Rfl: 5    Allergies:   No Known Allergies    Objective     Vital Signs  /60 (BP Location: Right arm, Patient Position: Sitting, Cuff Size: Adult)   Pulse 70   Temp 98 °F (36.7 °C) (Infrared)   Ht 175.3 cm (69.02\")   Wt 91.2 kg (201 lb)   SpO2 99%   BMI 29.67 kg/m²   Body mass index is 29.67 kg/m².     BMI is >= 25 and <30. (Overweight) The following options were offered after discussion;: weight loss educational material (shared in after visit summary), exercise counseling/recommendations, and nutrition counseling/recommendations       Physical Exam  Vitals and nursing note reviewed.   Constitutional:       Appearance: Normal appearance. She is well-developed.   HENT:      Head: Normocephalic and atraumatic.      Right Ear: Hearing, tympanic membrane, ear canal and external ear normal.      Left Ear: Hearing, tympanic membrane, ear canal and external ear normal.      Nose: Nose normal.      Mouth/Throat:      Pharynx: Uvula midline.   Eyes:      General: Lids are normal.      " Conjunctiva/sclera: Conjunctivae normal.      Pupils: Pupils are equal, round, and reactive to light.   Neck:      Thyroid: No thyromegaly.   Cardiovascular:      Rate and Rhythm: Normal rate and regular rhythm.      Heart sounds: Normal heart sounds.   Pulmonary:      Effort: Pulmonary effort is normal.      Breath sounds: Normal breath sounds.   Abdominal:      General: Bowel sounds are normal.      Palpations: Abdomen is soft.      Tenderness: There is no abdominal tenderness.   Musculoskeletal:         General: Normal range of motion.      Cervical back: Full passive range of motion without pain, normal range of motion and neck supple.      Right lower leg: Edema present.      Left lower leg: Edema present.   Skin:     General: Skin is warm and dry.   Neurological:      Mental Status: She is alert and oriented to person, place, and time.      Deep Tendon Reflexes: Reflexes are normal and symmetric.   Psychiatric:         Speech: Speech normal.         Behavior: Behavior normal.         Thought Content: Thought content normal.         Judgment: Judgment normal.        Procedures     Results:  No results found for this or any previous visit (from the past 24 hours).     Assessment and Plan     Assessment/Plan:  Diagnoses and all orders for this visit:    1. Essential hypertension (Primary)  -     Microalbumin / Creatinine Urine Ratio - Urine, Clean Catch; Future  -     Ambulatory Referral to Nutrition Services  -     Ambulatory Referral to [facility]    2. Thyroid disorder screening  -     TSH; Future  -     T4, Free; Future  -     T3, Free; Future  -     Ambulatory Referral to Nutrition Services    3. Vitamin D deficiency  -     Vitamin D,25-Hydroxy; Future    4. Hypercholesterolemia  -     Ambulatory Referral to Nutrition Services  -     Ambulatory Referral to [facility]    5. Rheumatoid arthritis with positive rheumatoid factor, involving unspecified site  -     Sedimentation  Rate; Future    6. Encounter for vitamin deficiency screening  -     Vitamin B12; Future  -     Vitamin D,25-Hydroxy; Future  -     Iron Profile; Future    7. Upper respiratory tract infection, unspecified type    8. Dizziness  -     CBC & Differential; Future  -     Comprehensive Metabolic Panel; Future    9. Lipid screening  -     Lipid Panel; Future    10. Diabetes mellitus screening  -     Hemoglobin A1c; Future    11. Leg swelling      Assessment & Plan  1. Hypotension.  Her hypotension may be attributed to an excessive dosage of antihypertensive medication. A cardiology workup, including a Holter monitor, will be conducted to evaluate her heart rate over a couple of weeks. A referral to a cardiologist will be made for further evaluation.    2. Hormonal fluctuations.  She is likely experiencing hormonal fluctuations, which could be contributing to her dizziness and cognitive fog. Given her family history of breast cancer in her mother, hormone therapy is not recommended. A referral to a nutritionist will be made to help manage her symptoms through dietary changes.    3. Anxiety.  She reports feeling anxious all the time. Lifestyle modifications, including increasing physical activity and reducing sedentary behavior, are recommended.    4. Edema.  She experiences leg swelling. The use of compression stockings is recommended to manage the swelling.    5. Hypoglycemia.  She reports symptoms of low blood sugar, including shakiness and sweating. A referral to a nutritionist will be made to help manage her blood sugar levels through dietary changes.    Follow-up  The patient will follow up in 6 weeks.     Follow Up  Return in about 6 weeks (around 5/6/2025).    Patient or patient representative verbalized consent for the use of Ambient Listening during the visit with  Aimee Levine PA-C for chart documentation. 3/25/2025  13:09 EDT      Aimee Levine PA-C   [facility]  [address]

## 2025-03-26 DIAGNOSIS — F51.01 PRIMARY INSOMNIA: ICD-10-CM

## 2025-03-27 DIAGNOSIS — F51.01 PRIMARY INSOMNIA: ICD-10-CM

## 2025-03-27 RX ORDER — TRAZODONE HYDROCHLORIDE 50 MG/1
50 TABLET ORAL NIGHTLY
Qty: 30 TABLET | Refills: 0 | Status: SHIPPED | OUTPATIENT
Start: 2025-03-27 | End: 2025-03-27 | Stop reason: SDUPTHER

## 2025-03-27 RX ORDER — TRAZODONE HYDROCHLORIDE 50 MG/1
50 TABLET ORAL NIGHTLY
Qty: 90 TABLET | Refills: 1 | Status: SHIPPED | OUTPATIENT
Start: 2025-03-27

## 2025-03-31 ENCOUNTER — RESULTS FOLLOW-UP (OUTPATIENT)
Dept: LAB | Facility: HOSPITAL | Age: 55
End: 2025-03-31
Payer: COMMERCIAL

## 2025-03-31 RX ORDER — ROSUVASTATIN CALCIUM 5 MG/1
5 TABLET, COATED ORAL DAILY
Qty: 90 TABLET | Refills: 1 | Status: SHIPPED | OUTPATIENT
Start: 2025-03-31

## 2025-04-01 ENCOUNTER — OFFICE VISIT (OUTPATIENT)
Dept: CARDIOLOGY | Facility: HOSPITAL | Age: 55
End: 2025-04-01
Payer: COMMERCIAL

## 2025-04-01 ENCOUNTER — HOSPITAL ENCOUNTER (OUTPATIENT)
Dept: CARDIOLOGY | Facility: HOSPITAL | Age: 55
Discharge: HOME | End: 2025-04-01
Payer: COMMERCIAL

## 2025-04-01 VITALS
DIASTOLIC BLOOD PRESSURE: 81 MMHG | HEIGHT: 69 IN | RESPIRATION RATE: 17 BRPM | BODY MASS INDEX: 30.57 KG/M2 | SYSTOLIC BLOOD PRESSURE: 138 MMHG | HEART RATE: 68 BPM | OXYGEN SATURATION: 99 % | WEIGHT: 206.38 LBS

## 2025-04-01 DIAGNOSIS — R60.0 PEDAL EDEMA: ICD-10-CM

## 2025-04-01 DIAGNOSIS — R42 DIZZINESS: ICD-10-CM

## 2025-04-01 DIAGNOSIS — R00.0 TACHYCARDIA: ICD-10-CM

## 2025-04-01 DIAGNOSIS — R42 DIZZINESS: Primary | ICD-10-CM

## 2025-04-01 DIAGNOSIS — I10 ESSENTIAL HYPERTENSION: ICD-10-CM

## 2025-04-01 PROCEDURE — 93246 EXT ECG>7D<15D RECORDING: CPT

## 2025-04-01 NOTE — PROGRESS NOTES
"Chief Complaint  Establish Care and Hypertension    Subjective    History of Present Illness {  Problem List  Visit  Diagnosis   Encounters  Notes  Medications  Labs  Result Review Imaging  Media :23}       History of Present Illness   54-year-old female presents the office today for ongoing evaluation of her fatigue, intermittent dizziness and elevated heart rates.  Reports she has been experiencing fatigue over the last few months.  Notes an episode recently where her blood pressure was 90/63 with a rapid heart rate of 130.  She notes lisinopril was discontinued 6 months ago.  Reports of blood pressures usually run 120s over 80-85.  Does not hydrate well and is on HCTZ daily.  She reports she is exercising at least 20 minutes before work and again 20 minutes after work.  Currently denies chest pain, dyspnea, syncope, orthopnea, PND.  Does report ongoing pedal edema. History of hyperlipidemia, insomnia, hypertension    Objective     Vital Signs:   Vitals:    04/01/25 0941 04/01/25 0943   BP: 134/87 138/81   BP Location: Left arm Left arm   Patient Position: Standing Sitting   Cuff Size: Adult Adult   Pulse: 72 68   Resp:  17   SpO2: 100% 99%   Weight:  93.6 kg (206 lb 6 oz)   Height:  175.3 cm (69.02\")     Body mass index is 30.46 kg/m².  Physical Exam  Vitals and nursing note reviewed.   Constitutional:       Appearance: Normal appearance.   HENT:      Head: Normocephalic.   Eyes:      Pupils: Pupils are equal, round, and reactive to light.   Cardiovascular:      Rate and Rhythm: Normal rate and regular rhythm.      Pulses: Normal pulses.      Heart sounds: Normal heart sounds. No murmur heard.  Pulmonary:      Effort: Pulmonary effort is normal.      Breath sounds: Normal breath sounds.   Abdominal:      General: Bowel sounds are normal.      Palpations: Abdomen is soft.   Musculoskeletal:         General: Normal range of motion.      Cervical back: Normal range of motion.      Right lower leg: No " edema.      Left lower leg: No edema.   Skin:     General: Skin is warm and dry.      Capillary Refill: Capillary refill takes less than 2 seconds.   Neurological:      Mental Status: She is alert and oriented to person, place, and time.   Psychiatric:         Mood and Affect: Mood normal.         Thought Content: Thought content normal.              Result Review  Data Reviewed:{ Labs  Result Review  Imaging  Med Tab  Media :23}   Duplex Carotid Ultrasound CAR (08/16/2023 10:28)   CBC & Differential (03/25/2025 09:56)  Comprehensive Metabolic Panel (03/25/2025 09:56)  Hemoglobin A1c (03/25/2025 09:56)  Lipid Panel (03/25/2025 09:56)  Microalbumin / Creatinine Urine Ratio - Urine, Clean Catch (03/25/2025 09:56)  TSH (03/25/2025 09:56)  T4, Free (03/25/2025 09:56)    T3, Free (03/25/2025 09:56)  Vitamin B12 (03/25/2025 09:56)  Vitamin D,25-Hydroxy (03/25/2025 09:56)  Sedimentation Rate (03/25/2025 09:56)  Iron Profile (03/25/2025 09:56)     Assessment and Plan {CC Problem List  Visit Diagnosis  ROS  Review (Popup)  Health Maintenance  Quality  BestPractice  Medications  SmartSets  SnapShot Encounters  Media :23}   1. Dizziness  Encouraged good hydration   - Adult Transthoracic Echo Complete W/ Cont if Necessary Per Protocol; Future  - Holter Monitor - 72 Hour Up To 15 Days; Future    2. Essential hypertension  Stable on hydrochlorothiazide  - Adult Transthoracic Echo Complete W/ Cont if Necessary Per Protocol; Future    3. Pedal edema  Begin wearing compression socks   - Adult Transthoracic Echo Complete W/ Cont if Necessary Per Protocol; Future    4. Tachycardia    - Holter Monitor - 72 Hour Up To 15 Days; Future          Follow Up {Instructions Charge Capture  Follow-up Communications :23}   Return in about 23 days (around 4/24/2025) for Telemedicine visit, Monitor results.    Patient was given instructions and counseling regarding her condition or for health maintenance advice. Please see  specific information pulled into the AVS if appropriate.  Patient was instructed to call the [facility] with any questions, concerns, or worsening symptoms.

## 2025-04-01 NOTE — PROGRESS NOTES
[facility] Heart Monitor Documentation    Tanner Mills  1970  [id number]  04/01/25      [] ZIO XT Patch  Model [id number] Prescribed for N/A Days    Serial Number: (N + 9 Digits) N   Apply-By Date on Box:   USPS Tracking Number:   USPS Tracking        [] [employer]ian MINI PLUS Mobile Cardiac Telemetry  Model BGMINIPLUS Prescribed for N/A Days    Serial Number: (BGM + 7 Digits) BGM  Shipped-By Date on Box:   UPS Tracking Number: [id number]  UPS Tracking      [] [employer]ian MINI Holter Monitor  Model BGMINIEL Prescribed for 14 Days    Serial Number: (7 Digits) [id number]  Shipped-By Date on Box: 930455  UPS Tracking Number: [id number]  UPS Tracking        This monitor was applied to the patient's chest and checked for proper functioning.  Ms. Tanner Mills was instructed in the proper use of this monitor.  She was given the opportunity to ask questions and left the office with the device 's instruction manual.    Brisa Rodriguez MA, 10:10 EDT, 04/01/25                  [facility]

## 2025-04-09 ENCOUNTER — HOSPITAL ENCOUNTER (OUTPATIENT)
Dept: NUTRITION | Facility: HOSPITAL | Age: 55
Setting detail: RECURRING SERIES
Discharge: HOME | End: 2025-04-09

## 2025-04-09 NOTE — PROGRESS NOTES
[facility]          Initial 60 Minute Nutrition Visit    Date: 2025   Patient Name: Tanner Mills  : 1970   MRN: [id number]   Referring Provider: Aimee Levine, *    Reason for Visit: HTN; HLD; T4  Visit Format: IP    Nutrition Assessment       Social History:   Social History     Socioeconomic History    Marital status:    Tobacco Use    Smoking status: Never    Smokeless tobacco: Never    Tobacco comments:     None   Vaping Use    Vaping status: Never Used   Substance and Sexual Activity    Alcohol use: No    Drug use: No    Sexual activity: Yes     Partners: Male     Birth control/protection: Post-menopausal     Active Problem List:   Patient Active Problem List    Diagnosis     Primary insomnia [F51.01]     Polyuria [R35.89]     Loss of consciousness [R40.20]     Pelvic pressure in female [R10.2]     Hypercholesterolemia [E78.00]     Arthralgia of multiple joints [M25.50]     Routine physical examination [Z00.00]     Encounter for screening mammogram for malignant neoplasm of breast [Z12.31]     Menopausal symptoms [N95.1]     Leg swelling [M79.89]     Colon cancer screening [Z12.11]     Dizziness [R42]     Routine gynecological examination [Z01.419]     BMI 28.0-28.9,adult [Z68.28]     Vitamin D deficiency [E55.9]     Essential hypertension [I10]     Myalgia [M79.10]     Nausea [R11.0]     Rheumatoid arthritis [M06.9]       Current Medications:   Current Outpatient Medications:     hydroCHLOROthiazide 25 MG tablet, Take 1 tablet by mouth once daily, Disp: 90 tablet, Rfl: 0    rosuvastatin (Crestor) 5 MG tablet, Take 1 tablet by mouth Daily., Disp: 90 tablet, Rfl: 1    traZODone (DESYREL) 50 MG tablet, Take 1 tablet by mouth Every Night., Disp: 90 tablet, Rfl: 1    Labs: Chol: 260 (increase from 229); T (decrease from 78); HDL: 78; LDL: 172 (increase from 148); A1c: 5.4; B; Vit D: 29.5 (increased from 27.3)     Hunger Vital Sign Food  "Insecurity Assessment:  Within the past 12 months I/we worried whether our food would run out before I/we got money to buy more: No   Within the past 12 months the food I/we bought just didn't last and I/we didn't have money to get more: No   Use of food assistance programs (WIC, food stamps, food ramsey) No       Food & Nutrition Related History       Food Allergies: NKFA  Food Intolerances: None  Food Behavior: None  Nutrition Impact Symptoms: constipation  Gastrointestinal conditions that impact intake or food choices: None  Who prepares most meals: Self  Who does grocery shopping: Self  How many meals are purchased from fast food/sit down restaurants per week: Occasionally  Difficulty chewin - Normal  Difficulty swallowin - Normal  Diet requirement related to personal preference or cultural belief: None indicated  History of eating disorder/disordered eating habits: None  Language/communication details: English  Barriers to learning: No barriers identified at this time    24 Hour Recall:   Time Food/beverages consumed   Breakfast (wakes with headache, fatigue) 15 Animal crackers; 2tbsp peanut butter, 1 cup of pears   Snack none   Lunch none   Snack none   Dinner 2.5 cups of Lc's Pecan Berry Salad   Snack 1 slice of toast with 1 tbsp sugar and cinnamon   Beverage 1 cup of coffee with Chobani creamer  1-2 Diet Soda  No water, tea     Additional comments: Tanner enjoys most fruits, vegetables, and beans, whole grain bread and brown rice, chicken, pork, and occasional fish/seafood and nuts/seeds and nut butters. She does not enjoy eggs, yogurt (although she is willing to try something like Chobani SF), quinoa, pasta, smoothies.     Anthropometrics      Height:   Ht Readings from Last 1 Encounters:   25 175.3 cm (69.02\")     Weight:   Wt Readings from Last 3 Encounters:   25 93.6 kg (206 lb 6 oz)   25 91.2 kg (201 lb)   24 93 kg (205 lb)     BMI: 30.46  Weight Change: see above "     Physical Activity     Physical activity comments: Did not discuss at this appointment     Estimated Needs     Estimated Energy Needs: 600kcal/day (1.2, -500)    Estimated Protein Needs: 100-115g/day (1.0-1.2g/kg CBW)     Estimated Fluid Needs: Minimum of 64oz water/day     Discussion / Education      Tanner is a 54 year old female referred for HTN, HLD and hypothyroidism who has a history of RA, constipation, nausea, Vit D deficiency and is post-menopausal. Tanner states that her biggest challenge is making healthy choices and a concern over hypoglycemia. Her motivation is to reduce fluid retention and improve overall feeling of well-being. Tanner reports that she has been experiencing fluid retention for ~10 years and feels that going through menopause made it worse. She has also been frequently experiencing episodes of hypoglycemia where she feels shaky and dizzy. Tanner reports that she experiences headaches, nausea and overall feeling of malaise in the morning, which has become more consistent since going through menopause. Tanner's current fluid intake is minimal, comprising mostly of coffee with cream in the morning and 1-2 Diet Soda's in the afternoon/evening. She does not enjoy drinking water but her intake of fluids has been minimal for many years. Tanner does not eat consistently throughout the day, although she feels that her general intake is healthy in that she includes protein sources such as chicken, salmon, tuna, beans, cottage cheese and edamame; fiber sources such as most fruits and vegetables, whole grain bread and brown rice; and healthy fats such as salmon, olive oil and some nuts/seeds. Tanner expresses frustration with her current state of health, being more concerned with feeling better than looking better. Tanner is currently taking a Vit D supplement, but does not utilize a MV.     RD emphasized the importance of incorporating a variety of sources of proteins, carbs and fats, as well as  "increasing fiber rich choices such as whole grains and vegetables/fruits to improve blood sugar, cholesterol, and satiety. Discussed the difference between saturated and unsaturated fat and provided examples of each. Discussed the difference between simple and complex carbohydrates. RD encouraged Tanner to incorporate complex carbohydrates at meal times that are high in fiber. Provided patient with foods that are high in fiber and snack ideas that incorporate high fiber foods. Recommended 25-30 grams of fiber per day. RD reviewed the different types of dietary fat and the roles of each in managing cholesterol and reducing risks of heart disease and other complications. RD provided a visual of a \"balanced meal\" with adequate portions of carbohydrates, protein, and fruits/vegetables. RD also discussed the importance of eating a protein and fiber rich breakfast to improve energy levels and blood glucose throughout the rest of the day. RD encouraged Tanner to incorporate a plant based or lean animal protein source with every snack and meal, aiming for ~30g with meals and ~10g for snacks. RD discussed limiting foods that are high in added sugar, recommending no more than 20g of added sugar per day. As Tanner has not been consistently eating throughout the day and hypoglycemic episodes are a concern, RD recommends starting with 3 consistent protein + fiber meals per day. In relation to Tanner's concerns of waking up with a headache and feeling 'blah,' RD discussed the importance of adequate hydration and recommended starting with a minimum of 32 ounces of water per day. Inadequate fluid intake may also be contributing to Tanner's lower extremity edema.     Assessment of patient engagement: Engaged; asked great questions    Measurement of understanding: Patient verbalized understanding, Patient able to demonstrate understanding with teach back     Resources Provided: LORNA CONNIE Healthy Meal and Snack Ideas and High Fiber Food " List; Macros; Jerome Fiber; Plant Powered Protein; Lowering Cholesterol and TG; ADA Plate; Med. Diet plate    RD provided contact information and encouraged Tanner to reach out with any additional questions or concerns.     Goal (s)      Goal 1: Incorporate 8oz of water with morning, afternoon and evening meal with 'bonus' glass mid-morning or mid-afternoon.      Goal 2: Eliminate Diet Soda from daily intake (acceptable to enjoy 1-2 per week)     Goal 3: A minimum of three consistent protein+fiber (~30g of protein and ~5-10g of fiber) forward meals during the day, 1-2 protein/fiber snacks if needed.      Plan of Care     PES Statement:   Inadequate oral intake related to skipping meals and not eating balanced meals as evidenced by dietary recall and interview.     Follow Up Visit      Follow Up:   Tuesday May 20th at 10:30am, TEAMS    Total of 60 minutes spent with patient on nutrition counseling. Education based on Academy of Nutrition and Dietetics guidelines. Patient was provided with RD's contact information. Thank you for this referral.

## 2025-05-01 ENCOUNTER — TELEMEDICINE (OUTPATIENT)
Dept: CARDIOLOGY | Facility: HOSPITAL | Age: 55
End: 2025-05-01
Payer: COMMERCIAL

## 2025-05-01 VITALS — HEART RATE: 78 BPM | WEIGHT: 206 LBS | HEIGHT: 69 IN | BODY MASS INDEX: 30.51 KG/M2

## 2025-05-01 DIAGNOSIS — R00.0 TACHYCARDIA: ICD-10-CM

## 2025-05-01 DIAGNOSIS — R42 DIZZINESS: Primary | ICD-10-CM

## 2025-05-01 DIAGNOSIS — R60.0 PEDAL EDEMA: ICD-10-CM

## 2025-05-01 DIAGNOSIS — I10 ESSENTIAL HYPERTENSION: ICD-10-CM

## 2025-05-01 NOTE — PROGRESS NOTES
"Chief Complaint  Follow-up (Dizziness )    Subjective    History of Present Illness {CC  Problem List  Visit  Diagnosis   Encounters  Notes  Medications  Labs  Result Review Imaging  Media :23}     You have chosen to receive care through the use of telemedicine. Telemedicine enables health care providers at different locations to provide safe, effective, and convenient care through the use of technology. As with any health care service, there are risks associated with the use of telemedicine, including equipment failure, poor connections, and  issues.    Do you understand the risks and benefits of telemedicine as I have explained them to you? Yes  Have your questions regarding telemedicine been answered? Yes  Do you consent to the use of telemedicine in your medical care today? Yes   History of Present Illness   54-year-old female presents for telemedicine visit today  for ongoing evaluation of her fatigue, intermittent dizziness and elevated heart rates.  Reports she has been experiencing fatigue over the last few months.  Notes an episode recently where her blood pressure was 90/63 with a rapid heart rate of 130.  She notes lisinopril was discontinued 6 months ago.  Reports of blood pressures usually run 120s over 80-85.  Previously had not been hydrating well but notes she is hydrating much better now and is on HCTZ daily.  She reports she is exercising at least 20 minutes before work and again 20 minutes after work.  Currently denies chest pain, dyspnea, syncope, orthopnea, PND.  Does report ongoing pedal edema. History of hyperlipidemia, insomnia, hypertension. Recently met with nutritionist. Notes that dizziness has improved. Notes that she is still experiencing fatigue but it is not as significant.    Objective     Vital Signs:   Vitals:    05/01/25 1008   Pulse: 78   Weight: 93.4 kg (206 lb)   Height: 175.3 cm (69.02\")       Body mass index is 30.4 kg/m².  Physical Exam  Vitals " and nursing note reviewed.   Constitutional:       Appearance: Normal appearance.   HENT:      Head: Normocephalic.   Pulmonary:      Effort: Pulmonary effort is normal.   Neurological:      Mental Status: She is alert and oriented to person, place, and time.   Psychiatric:         Mood and Affect: Mood normal.         Behavior: Behavior normal.         Thought Content: Thought content normal.              Result Review  Data Reviewed:{ Labs  Result Review  Imaging  Med Tab  Media :23}   Duplex Carotid Ultrasound CAR (08/16/2023 10:28)   CBC & Differential (03/25/2025 09:56)  Comprehensive Metabolic Panel (03/25/2025 09:56)  Hemoglobin A1c (03/25/2025 09:56)  Lipid Panel (03/25/2025 09:56)  Microalbumin / Creatinine Urine Ratio - Urine, Clean Catch (03/25/2025 09:56)  TSH (03/25/2025 09:56)  T4, Free (03/25/2025 09:56)    T3, Free (03/25/2025 09:56)  Vitamin B12 (03/25/2025 09:56)  Vitamin D,25-Hydroxy (03/25/2025 09:56)  Sedimentation Rate (03/25/2025 09:56)  Iron Profile (03/25/2025 09:56)  Echo 4/18/2025: EF 65% with no valvular disease noted.    Holter Monitor - 72 Hour Up To 15 Days (04/01/2025 10:07)   Assessment and Plan {CC Problem List  Visit Diagnosis  ROS  Review (Popup)  Health Maintenance  Quality  BestPractice  Medications  SmartSets  SnapShot Encounters  Media :23}   1. Dizziness  Encouraged good hydration   No arrhythmias noted on recent Holter    2. Essential hypertension  Stable on hydrochlorothiazide    3. Pedal edema  Begin wearing compression socks   Echo within normal limits  Patient may take an extra dose of HCTZ today only due to pedal edema  4. Tachycardia    No arrhythmias noted on recent Holter          Follow Up {Instructions Charge Capture  Follow-up Communications :23}   Return if symptoms worsen or fail to improve.    Patient was given instructions and counseling regarding her condition or for health maintenance advice. Please see specific information pulled into the  AVS if appropriate.  Patient was instructed to call the [facility] with any questions, concerns, or worsening symptoms.

## 2025-05-20 ENCOUNTER — HOSPITAL ENCOUNTER (OUTPATIENT)
Dept: NUTRITION | Facility: HOSPITAL | Age: 55
Setting detail: RECURRING SERIES
Discharge: HOME | End: 2025-05-20

## 2025-05-20 NOTE — PROGRESS NOTES
"[facility]   Free 30 Minute Nutrition Follow Up     Date: 2025   Patient Name: Tanner Mills  : 1970   MRN: [id number]   Referring Provider: Aimee Levine, *    Reason for Visit: HTN; HLD; Hypothryoidism  Visit Format: Telehealth  Last Appointment Date:     Nutrition Assessment       Labs: Chol: 260 (increase from 229); T (decrease from 78); HDL: 78; LDL: 172 (increase from 148); A1c: 5.4; B; Vit D: 29.5 (increased from 27.3)   Food Allergies: NKFA  Food Intolerances: None  Food Behavior: None  Nutrition Impact Symptoms: constipation  Gastrointestinal conditions that impact intake or food choices: None  Difficulty chewin - Normal  Difficulty swallowin - Normal    Anthropometrics      Height:   Ht Readings from Last 1 Encounters:   25 175.3 cm (69.02\")     Weight:   Wt Readings from Last 3 Encounters:   25 93.4 kg (206 lb)   25 93.6 kg (206 lb 6 oz)   25 91.2 kg (201 lb)     Weight Change: See above     Discussion / Education      Tanner presents today for follow-up nutrition counseling. Since the time of her initial appointment, Tanner reports that she has been consistent with smaller, more frequent protein + fiber meals, which has completely resolved episodes of hypoglycemia. Tanner is still experiencing morning headaches, nausea and malaise but states that she has not been able to drink consistently throughout the day. She drinks an 8oz glass of water in the morning and before bed but otherwise does not drink anything outside of lunch time Diet Soda. Tanner is also still experiencing fluid retention in her lower extremities. She states that she is not using added salt at home and remains mindful of avoiding high sodium foods when eating out. She is consuming high water content foods such as cucumbers, berries, celery and avoiding refined carbs. RD inquired as to daily movement and Tanner reports that she walks " 20 minutes in the morning but otherwise does not frequently get up from a seated position at work. She has begun wearing compression socks, which she feels has helped. Tanner has begun taking a daily MV.     RD is pleased that hypoglycemia episodes have resolved as a result of consistent protein + fiber intake throughout the day. RD emphasized the importance of hydration for kidney health as well as to possibly contribute to the  management of morning symptoms of headaches, nausea and malaise. RD recommends a minimum of 32oz of water/day (double what she is currently drinking) but informs Tanner that 64oz would be ideal. RD discussed benefits of hydration in managing fluid retention, especially given Tanner's statement that she is mindful of sodium intake. RD inquired as to barriers to adequate hydration as well as ways that Tanner could successfully incorporate water intake by stacking on to other habits. Tanner states that she does not drink when she eats her snacks/mini-meals but may try to drink in between. She had previously set a timer to remind her to drink but found herself 'snoozing' it or completely ignoring it. RD stressed the need for movement throughout the day as well in order to increase circulation. RD made recommendations for trying to move around the office for at least five minutes every hour, if Tanner is able although having 1-2 extended periods of movement (5-10 minute walk post lunch or going up/down steps several times) would also be beneficial. RD encouraged Tanner to continue wearing compression socks but that this will not fully resolve fluid retention if she is not adequately hydrated and moving throughout the day.     Assessment of patient engagement: Engaged    Measurement of understanding: Patient verbalized understanding    Resources Provided:  No further resources provided at this time    Goal (s)    Previous goals:   Goal 1: Incorporate 8oz of water with morning, afternoon and  evening meal with 'bonus' glass mid-morning or mid-afternoon.  Met: 50%     Goal 2: Eliminate Diet Soda from daily intake (acceptable to enjoy 1-2 per week) Met: 50% (Tanner is drinking soda once every day or every other day but feels that it is out of habit than need/want).     Goal 3: A minimum of three consistent protein+fiber (~30g of protein and ~5-10g of fiber) forward meals during the day, 1-2 protein/fiber snacks if needed.  Met: 100%    Current goals:  Goal 1: Incorporate 8oz of water with morning, afternoon and evening meal with 'bonus' glass mid-morning or mid-afternoon.     Goal 2: In addition to 20 minute morning walk, Tanner will incorporate 1-2 periods of light exercise (5-10 minute walk midday, going up/down office steps 3-4 times in one period) during the workday to help increase circulation and reduce fluid retention.     Plan of Care     PES Statement:   Inadequate oral intake related to skipping meals and not eating balanced meals as evidenced by dietary recall and interview.     Status: Ongoing    Follow Up Visit      Follow Up not scheduled at this time Tanner will contact RD in future concerning next f/u appointment.    Total of 30 minutes spent with patient on nutrition counseling. Education based on Academy of Nutrition and Dietetics guidelines. Patient was provided with RD's contact information. Thank you for this referral.

## 2025-05-28 ENCOUNTER — OFFICE VISIT (OUTPATIENT)
Dept: INTERNAL MEDICINE | Facility: CLINIC | Age: 55
End: 2025-05-28
Payer: COMMERCIAL

## 2025-05-28 VITALS
SYSTOLIC BLOOD PRESSURE: 124 MMHG | OXYGEN SATURATION: 99 % | HEIGHT: 69 IN | DIASTOLIC BLOOD PRESSURE: 88 MMHG | HEART RATE: 68 BPM | WEIGHT: 202.4 LBS | TEMPERATURE: 98.2 F | BODY MASS INDEX: 29.98 KG/M2

## 2025-05-28 DIAGNOSIS — I10 ESSENTIAL HYPERTENSION: ICD-10-CM

## 2025-05-28 DIAGNOSIS — E78.00 HYPERCHOLESTEROLEMIA: Chronic | ICD-10-CM

## 2025-05-28 DIAGNOSIS — M25.571 ACUTE RIGHT ANKLE PAIN: ICD-10-CM

## 2025-05-28 DIAGNOSIS — R42 DIZZINESS: Primary | ICD-10-CM

## 2025-05-28 PROCEDURE — 99214 OFFICE O/P EST MOD 30 MIN: CPT | Performed by: PHYSICIAN ASSISTANT

## 2025-05-28 RX ORDER — PREDNISONE 20 MG/1
20 TABLET ORAL 2 TIMES DAILY
Qty: 10 TABLET | Refills: 0 | Status: SHIPPED | OUTPATIENT
Start: 2025-05-28

## 2025-05-28 NOTE — PROGRESS NOTES
Office Note     Name: Tanner Mills    : 1970     MRN: [id number]     Chief Complaint  2 mo f/u, Hypertension, and Hyperlipidemia    Subjective     History of Present Illness:  Tanner Mills is a 55 y.o. female who presents today for follow up.    History of Present Illness  The patient presents for evaluation of dizziness and right ankle pain.    She reports an improvement in her dizziness, attributing it to increased water intake and dietary modifications suggested by a nutritionist. These changes have also alleviated her symptoms of hypoglycemia. She has been consuming approximately 32 ounces of water daily and has incorporated berries and cheese into her diet. She has not been drinking water with electrolytes.    She has been experiencing persistent swelling in her right ankle for the past 3 weeks, which she suspects may be due to rheumatoid arthritis. Despite wearing compression stockings during work hours and as needed on weekends, the swelling remains unchanged. She does not wear them to bed. She has been advised to engage in physical activity every 2 hours at work, but the swelling necessitates ascending stairs one step at a time. She has attempted to alleviate the swelling through rest and nightly lymphatic massage, but these interventions have not resulted in any improvement. She reports no recent travel history. She has been using compression tights during her drives to [place], but these have not provided any relief. She expresses concern about the possibility of gout or blood clots. She has not experienced any specific event that could have triggered the swelling.    Review of Systems:   Review of Systems   Constitutional:  Positive for fatigue. Negative for chills, diaphoresis and fever.   HENT:  Negative for congestion, sore throat and swollen glands.    Respiratory:  Negative for cough.    Cardiovascular:  Negative for chest pain.   Gastrointestinal:  Negative for abdominal  pain, nausea and vomiting.   Genitourinary:  Negative for dysuria.   Musculoskeletal:  Positive for joint swelling. Negative for myalgias and neck pain.   Skin:  Negative for rash.   Neurological:  Negative for weakness and numbness.       Past Medical History:   Past Medical History:   Diagnosis Date    Anxiety 3/19    Hyperlipidemia     Hypertension 3/19       Past Surgical History:   Past Surgical History:   Procedure Laterality Date    APPENDECTOMY  1983       Family History:   Family History   Problem Relation Age of Onset    Breast cancer Mother 58    Cancer Mother     Hyperlipidemia Mother     Hypertension Mother     Arthritis Father     Cancer Father     Diabetes Father     Hyperlipidemia Father     Hypertension Father     Ovarian cancer Neg Hx        Social History:   Social History     Socioeconomic History    Marital status:    Tobacco Use    Smoking status: Never    Smokeless tobacco: Never    Tobacco comments:     None   Vaping Use    Vaping status: Never Used   Substance and Sexual Activity    Alcohol use: No    Drug use: No    Sexual activity: Yes     Partners: Male     Birth control/protection: Post-menopausal       Immunizations:   Immunization History   Administered Date(s) Administered    COVID-19 (Xpliant) 03/12/2021    COVID-19 (PFIZER) Purple Cap Monovalent 12/17/2021    Fluzone (or Fluarix & Flulaval for VFC) >6mos 09/25/2020    Hepatitis A 11/12/2018    Tdap 10/18/2021    flucelvax quad pfs =>4 YRS 10/14/2019        Medications:     Current Outpatient Medications:     hydroCHLOROthiazide 25 MG tablet, Take 1 tablet by mouth once daily, Disp: 90 tablet, Rfl: 0    rosuvastatin (Crestor) 5 MG tablet, Take 1 tablet by mouth Daily., Disp: 90 tablet, Rfl: 1    traZODone (DESYREL) 50 MG tablet, Take 1 tablet by mouth Every Night., Disp: 90 tablet, Rfl: 1    predniSONE (DELTASONE) 20 MG tablet, Take 1 tablet by mouth 2 (Two) Times a Day., Disp: 10 tablet, Rfl: 0    Allergies:   No Known  "Allergies    Objective     Vital Signs  /88 (BP Location: Left arm, Patient Position: Sitting, Cuff Size: Adult)   Pulse 68   Temp 98.2 °F (36.8 °C) (Infrared)   Ht 175.3 cm (69.02\")   Wt 91.8 kg (202 lb 6.4 oz)   SpO2 99%   BMI 29.88 kg/m²   Body mass index is 29.88 kg/m².     BMI is >= 25 and <30. (Overweight) The following options were offered after discussion;: weight loss educational material (shared in after visit summary), exercise counseling/recommendations, and nutrition counseling/recommendations       Physical Exam  Vitals reviewed.   Constitutional:       Appearance: Normal appearance. She is well-developed.   HENT:      Head: Normocephalic and atraumatic.      Right Ear: Hearing, tympanic membrane, ear canal and external ear normal.      Left Ear: Hearing, tympanic membrane, ear canal and external ear normal.      Nose: Nose normal.      Mouth/Throat:      Pharynx: Uvula midline.   Eyes:      General: Lids are normal.      Conjunctiva/sclera: Conjunctivae normal.      Pupils: Pupils are equal, round, and reactive to light.   Cardiovascular:      Rate and Rhythm: Normal rate and regular rhythm.      Heart sounds: Normal heart sounds.   Pulmonary:      Effort: Pulmonary effort is normal.      Breath sounds: Normal breath sounds.   Abdominal:      General: Bowel sounds are normal.      Palpations: Abdomen is soft.   Musculoskeletal:         General: Tenderness present. Normal range of motion.      Cervical back: Full passive range of motion without pain, normal range of motion and neck supple.      Right lower leg: Edema present.   Skin:     General: Skin is warm and dry.   Neurological:      Mental Status: She is alert and oriented to person, place, and time.      Deep Tendon Reflexes: Reflexes are normal and symmetric.   Psychiatric:         Speech: Speech normal.         Behavior: Behavior normal.         Thought Content: Thought content normal.         Judgment: Judgment normal.      "         Procedures     Results:  No results found for this or any previous visit (from the past 24 hours).     Assessment and Plan     Assessment/Plan:  Diagnoses and all orders for this visit:    1. Dizziness (Primary)    2. Acute right ankle pain  -     predniSONE (DELTASONE) 20 MG tablet; Take 1 tablet by mouth 2 (Two) Times a Day.  Dispense: 10 tablet; Refill: 0    3. Essential hypertension    4. Hypercholesterolemia        Assessment & Plan  1. Acute right ankle pain.  - The etiology of the pain could be multifactorial, potentially indicative of gout or rheumatoid arthritis.  - A photograph of the affected area will be taken and uploaded to her medical record for future reference.  - A prescription for prednisone 20 mg, to be taken twice daily for a duration of 5 days, has been provided. If there is no improvement in her condition following the course of prednisone, she is to inform us so that an alternative treatment plan can be considered.    2. Dizziness.  - The patient reports improvement in dizziness, attributing it to increased water intake and dietary changes recommended by a nutritionist.  - She is currently drinking 32 ounces of water daily and plans to increase this amount as advised.  - The patient has been following dietary recommendations to manage low blood sugar symptoms, which has been beneficial.  - Continued monitoring of symptoms and adherence to dietary and hydration guidelines is recommended.    3. Blood pressure management.  - Her blood pressure readings are within the normal range today.  - She is advised to continue her current medications without any changes.  - She has been given the option to take two HCTZ tablets during periods of significant swelling, as long as it is not done daily.  - Regular monitoring of blood pressure and symptoms is advised.    Follow-up  - The patient will follow up in 3 months.     Follow Up  Return in about 3 months (around 8/28/2025).    Patient or patient  representative verbalized consent for the use of Ambient Listening during the visit with  Aimee Levine PA-C for chart documentation. 5/28/2025  12:55 EDT      Aimee Levine PA-C   [facility] [place] [id number]  Answers submitted by the patient for this visit:  Problem not listed (Submitted on 5/26/2025)  Chief Complaint: Other medical problem  anorexia: No  joint pain: Yes  change in stool: No  headaches: No  joint swelling: Yes  vertigo: No  visual change: No  Onset: 1 to 6 months  Chronicity: recurrent  Frequency: daily

## 2025-06-05 DIAGNOSIS — I10 ESSENTIAL HYPERTENSION: ICD-10-CM

## 2025-06-06 RX ORDER — HYDROCHLOROTHIAZIDE 25 MG/1
25 TABLET ORAL DAILY
Qty: 90 TABLET | Refills: 0 | Status: SHIPPED | OUTPATIENT
Start: 2025-06-06

## 2025-08-28 ENCOUNTER — OFFICE VISIT (OUTPATIENT)
Dept: INTERNAL MEDICINE | Facility: CLINIC | Age: 55
End: 2025-08-28
Payer: COMMERCIAL

## 2025-08-28 VITALS
WEIGHT: 199 LBS | TEMPERATURE: 96.8 F | HEART RATE: 68 BPM | BODY MASS INDEX: 29.47 KG/M2 | SYSTOLIC BLOOD PRESSURE: 128 MMHG | HEIGHT: 69 IN | DIASTOLIC BLOOD PRESSURE: 74 MMHG

## 2025-08-28 DIAGNOSIS — F41.9 ANXIETY: ICD-10-CM

## 2025-08-28 DIAGNOSIS — E78.00 HYPERCHOLESTEROLEMIA: Chronic | ICD-10-CM

## 2025-08-28 DIAGNOSIS — I10 ESSENTIAL HYPERTENSION: Primary | ICD-10-CM

## 2025-08-28 DIAGNOSIS — E55.9 VITAMIN D DEFICIENCY: ICD-10-CM

## 2025-08-28 PROCEDURE — 99214 OFFICE O/P EST MOD 30 MIN: CPT | Performed by: PHYSICIAN ASSISTANT

## 2025-08-28 RX ORDER — ROSUVASTATIN CALCIUM 5 MG/1
5 TABLET, COATED ORAL NIGHTLY
Qty: 90 TABLET | Refills: 1 | Status: SHIPPED | OUTPATIENT
Start: 2025-08-28

## 2025-08-28 RX ORDER — BUSPIRONE HYDROCHLORIDE 5 MG/1
5 TABLET ORAL 2 TIMES DAILY
Qty: 30 TABLET | Refills: 1 | Status: SHIPPED | OUTPATIENT
Start: 2025-08-28